# Patient Record
Sex: FEMALE | Race: WHITE | NOT HISPANIC OR LATINO
[De-identification: names, ages, dates, MRNs, and addresses within clinical notes are randomized per-mention and may not be internally consistent; named-entity substitution may affect disease eponyms.]

---

## 2017-06-08 ENCOUNTER — CHARTING TRANS (OUTPATIENT)
Dept: OTHER | Age: 62
End: 2017-06-08

## 2019-04-04 ENCOUNTER — OFFICE VISIT (OUTPATIENT)
Dept: FAMILY MEDICINE CLINIC | Facility: CLINIC | Age: 64
End: 2019-04-04
Payer: MEDICARE

## 2019-04-04 ENCOUNTER — TELEPHONE (OUTPATIENT)
Dept: FAMILY MEDICINE CLINIC | Facility: CLINIC | Age: 64
End: 2019-04-04

## 2019-04-04 VITALS
WEIGHT: 195.5 LBS | DIASTOLIC BLOOD PRESSURE: 88 MMHG | HEART RATE: 73 BPM | BODY MASS INDEX: 32.57 KG/M2 | HEIGHT: 65 IN | OXYGEN SATURATION: 95 % | RESPIRATION RATE: 16 BRPM | TEMPERATURE: 98 F | SYSTOLIC BLOOD PRESSURE: 128 MMHG

## 2019-04-04 DIAGNOSIS — H61.23 BILATERAL IMPACTED CERUMEN: Primary | ICD-10-CM

## 2019-04-04 DIAGNOSIS — L30.9 ECZEMA OF RIGHT HAND: ICD-10-CM

## 2019-04-04 PROCEDURE — 99203 OFFICE O/P NEW LOW 30 MIN: CPT | Performed by: PHYSICIAN ASSISTANT

## 2019-04-04 RX ORDER — MOMETASONE FUROATE 1 MG/G
CREAM TOPICAL
Qty: 60 G | Refills: 0 | Status: SHIPPED | OUTPATIENT
Start: 2019-04-04 | End: 2019-04-04

## 2019-04-04 RX ORDER — BISOPROLOL FUMARATE AND HYDROCHLOROTHIAZIDE 2.5; 6.25 MG/1; MG/1
1 TABLET ORAL
Refills: 0 | COMMUNITY
Start: 2019-02-04 | End: 2021-05-18 | Stop reason: ALTCHOICE

## 2019-04-04 RX ORDER — MOMETASONE FUROATE 1 MG/G
CREAM TOPICAL
Qty: 60 G | Refills: 0 | Status: SHIPPED | OUTPATIENT
Start: 2019-04-04 | End: 2021-06-07 | Stop reason: ALTCHOICE

## 2019-04-04 RX ORDER — ZOSTER VACCINE RECOMBINANT, ADJUVANTED 50 MCG/0.5
KIT INTRAMUSCULAR
COMMUNITY
Start: 2019-01-24 | End: 2021-06-07 | Stop reason: ALTCHOICE

## 2019-04-04 RX ORDER — NEOMYCIN SULFATE, POLYMYXIN B SULFATE AND HYDROCORTISONE 10; 3.5; 1 MG/ML; MG/ML; [USP'U]/ML
SUSPENSION/ DROPS AURICULAR (OTIC) 3 TIMES DAILY
Qty: 10 ML | Refills: 0 | Status: SHIPPED | OUTPATIENT
Start: 2019-04-04 | End: 2021-05-18 | Stop reason: ALTCHOICE

## 2019-04-04 RX ORDER — NEOMYCIN SULFATE, POLYMYXIN B SULFATE AND HYDROCORTISONE 10; 3.5; 1 MG/ML; MG/ML; [USP'U]/ML
3 SUSPENSION/ DROPS AURICULAR (OTIC) 4 TIMES DAILY
Qty: 10 ML | Refills: 0 | Status: SHIPPED | OUTPATIENT
Start: 2019-04-04 | End: 2019-04-04

## 2019-04-04 RX ORDER — LOVASTATIN 20 MG/1
TABLET ORAL
Refills: 0 | COMMUNITY
Start: 2019-01-28 | End: 2021-05-18 | Stop reason: ALTCHOICE

## 2019-04-04 NOTE — TELEPHONE ENCOUNTER
[facility] closed when pt arrived to  rx, requests rx be sent to [facility] in [place]. [facility] contacted, rx's canceled and re-submitted to [facility] in [place] per [facility].      Ofelia Lucio, 04/04/19, 9:43 AM

## 2019-04-04 NOTE — PROGRESS NOTES
CHIEF COMPLAINT:   Patient presents with:  Ear Pain: left ear pressure x 1 day. pt used at home ear flush kit with no relief. no cough/congestion/fever  Pain: right hand index finger rash x months.        HPI:   Ruchi Soriano is a 70year old female who /88 (BP Location: Left arm, Patient Position: Sitting, Cuff Size: adult)   Pulse 73   Temp 97.9 °F (36.6 °C) (Oral)   Resp 16   Ht 65\"   Wt 195 lb 8 oz   SpO2 95%   BMI 32.53 kg/m²   GENERAL: well developed, well nourished,in no apparent distress  S Risk and benefits of medication discussed. If antibiotics prescribed, stressed importance of completing full course of antibiotic. Acetaminophen or NSAID prn pain.       Follow up with PCP if s/sx worsen, do not begin to improve in 3 days, or if fever A number of conditions lead to earwax buildup. These include some skin problems, a narrow ear canal, or ears that make too much earwax. Using cotton swabs in the canal pushes earwax deeper into the ear and contributes to the buildup of earwax.   Home care · Fever of 100.4F°F (38°C) or higher, or as directed by your healthcare provider  · Worsening wax buildup  · Severe pain, dizziness, or nausea  · Bleeding from the ear  · Hearing problems  · Signs of irritation from the eardrops, such as burning, stinging, · You may use acetaminophen or ibuprofen to control pain, unless another medicine was prescribed.  Note: If you have chronic liver or kidney disease or ever had a stomach ulcer or GI bleeding, talk to your healthcare provider before taking any of these medi

## 2019-04-04 NOTE — PATIENT INSTRUCTIONS
1.  Bilateral ear lavage. 2.  In the future may try Debrox ear drops (over the counter) for future episodes  3. Cortisporin ear drops (antibiotic/steroid) drops to left ear for 5 days as directed.    AVoid water exposure to left ear (may use cotton ball Carefully follow the instructions given. · Don’t use mineral oil or OTC eardrops if you might have an ear infection or a ruptured eardrum. Tell your healthcare provider right away if you have diabetes or an immune disorder.   · Don’t use cotton swabs in yo [employer]. [address] [facility], [address] [place] [address]. All rights reserved. This information is not intended as a substitute for professional medical care. Always follow your healthcare professional's instructions.         External Ear Infect drugstores. They work by removing water from the ear canal.  Follow-up care  Follow up with your healthcare provider in 1 week, or as advised.   When to seek medical advice  Call your healthcare provider right away if any of these occur:  · Ear pain becomes

## 2019-05-01 ENCOUNTER — OFFICE VISIT (OUTPATIENT)
Dept: FAMILY MEDICINE CLINIC | Facility: CLINIC | Age: 64
End: 2019-05-01
Payer: MEDICARE

## 2019-05-01 ENCOUNTER — TELEPHONE (OUTPATIENT)
Dept: FAMILY MEDICINE CLINIC | Facility: CLINIC | Age: 64
End: 2019-05-01

## 2019-05-01 VITALS
WEIGHT: 195 LBS | OXYGEN SATURATION: 97 % | RESPIRATION RATE: 18 BRPM | SYSTOLIC BLOOD PRESSURE: 118 MMHG | DIASTOLIC BLOOD PRESSURE: 84 MMHG | TEMPERATURE: 98 F | HEART RATE: 76 BPM | BODY MASS INDEX: 32 KG/M2

## 2019-05-01 DIAGNOSIS — H66.92 ACUTE LEFT OTITIS MEDIA: Primary | ICD-10-CM

## 2019-05-01 PROCEDURE — 99213 OFFICE O/P EST LOW 20 MIN: CPT | Performed by: PHYSICIAN ASSISTANT

## 2019-05-01 RX ORDER — AMOXICILLIN 875 MG/1
875 TABLET, COATED ORAL 2 TIMES DAILY
Qty: 20 TABLET | Refills: 0 | Status: SHIPPED | OUTPATIENT
Start: 2019-05-01 | End: 2019-05-11

## 2019-05-01 RX ORDER — CIPROFLOXACIN AND DEXAMETHASONE 3; 1 MG/ML; MG/ML
4 SUSPENSION/ DROPS AURICULAR (OTIC) 2 TIMES DAILY
Qty: 7.5 ML | Refills: 0 | Status: SHIPPED | OUTPATIENT
Start: 2019-05-01 | End: 2019-05-08

## 2019-05-01 NOTE — TELEPHONE ENCOUNTER
Seen on 4/4/19, tx for cerumen impaction and OE with cortisporin. Finished 7 day course of cortisporin, ear felt slighlty better but not resolved. Tried 2nd round. Still having intermittent ear pain and popping sensation in ear since visit.   Reports red

## 2019-05-01 NOTE — PROGRESS NOTES
CHIEF COMPLAINT:   Patient presents with:  Ear Problem: left ear \"clogged/popping\".  here on 4/4/19      HPI:   Flaquito Zapien is a 70year old female who presents to clinic today with complaints of intermittent L ear pain/pressure and popping x 1 agustín Pulse 76   Temp 98.2 °F (36.8 °C) (Oral)   Resp 18   Wt 195 lb   SpO2 97%   BMI 32.45 kg/m²   GENERAL: well developed, well nourished,in no apparent distress  SKIN: no rashes,no suspicious lesions  HEAD: atraumatic, normocephalic  EYES: conjunctiva clear, Instructions   Declined

## 2019-05-02 DIAGNOSIS — Z86.010 HISTORY OF COLONIC POLYPS: ICD-10-CM

## 2019-05-24 ENCOUNTER — TELEPHONE (OUTPATIENT)
Dept: INTERNAL MEDICINE | Age: 64
End: 2019-05-24

## 2019-05-24 ENCOUNTER — OFFICE VISIT (OUTPATIENT)
Dept: INTERNAL MEDICINE | Age: 64
End: 2019-05-24

## 2019-05-24 ENCOUNTER — LAB SERVICES (OUTPATIENT)
Dept: LAB | Age: 64
End: 2019-05-24

## 2019-05-24 VITALS
BODY MASS INDEX: 32.95 KG/M2 | TEMPERATURE: 98.7 F | SYSTOLIC BLOOD PRESSURE: 134 MMHG | HEIGHT: 64 IN | DIASTOLIC BLOOD PRESSURE: 86 MMHG | WEIGHT: 193 LBS | RESPIRATION RATE: 20 BRPM | HEART RATE: 72 BPM

## 2019-05-24 DIAGNOSIS — E78.49 OTHER HYPERLIPIDEMIA: ICD-10-CM

## 2019-05-24 DIAGNOSIS — Z00.00 WELCOME TO MEDICARE PREVENTIVE VISIT: Primary | ICD-10-CM

## 2019-05-24 DIAGNOSIS — M81.0 OSTEOPOROSIS WITHOUT CURRENT PATHOLOGICAL FRACTURE, UNSPECIFIED OSTEOPOROSIS TYPE: ICD-10-CM

## 2019-05-24 DIAGNOSIS — E78.00 PURE HYPERCHOLESTEROLEMIA: ICD-10-CM

## 2019-05-24 DIAGNOSIS — Z12.11 COLON CANCER SCREENING: ICD-10-CM

## 2019-05-24 PROCEDURE — 36415 COLL VENOUS BLD VENIPUNCTURE: CPT | Performed by: INTERNAL MEDICINE

## 2019-05-24 PROCEDURE — G0403 EKG FOR INITIAL PREVENT EXAM: HCPCS | Performed by: INTERNAL MEDICINE

## 2019-05-24 PROCEDURE — 80053 COMPREHEN METABOLIC PANEL: CPT | Performed by: INTERNAL MEDICINE

## 2019-05-24 PROCEDURE — 80061 LIPID PANEL: CPT | Performed by: INTERNAL MEDICINE

## 2019-05-24 RX ORDER — ESTRADIOL 0.1 MG/G
1 CREAM VAGINAL
COMMUNITY
Start: 2018-05-22 | End: 2019-05-24 | Stop reason: SDUPTHER

## 2019-05-24 RX ORDER — BISOPROLOL FUMARATE AND HYDROCHLOROTHIAZIDE 2.5; 6.25 MG/1; MG/1
1 TABLET ORAL DAILY
Qty: 90 TABLET | Refills: 1 | Status: CANCELLED | OUTPATIENT
Start: 2019-05-24

## 2019-05-24 RX ORDER — LISINOPRIL AND HYDROCHLOROTHIAZIDE 12.5; 1 MG/1; MG/1
1 TABLET ORAL DAILY
Qty: 30 TABLET | Refills: 3 | Status: SHIPPED | OUTPATIENT
Start: 2019-05-24 | End: 2019-08-19 | Stop reason: SDUPTHER

## 2019-05-24 RX ORDER — PRAVASTATIN SODIUM 40 MG
40 TABLET ORAL DAILY
Qty: 30 TABLET | Refills: 11 | Status: SHIPPED | OUTPATIENT
Start: 2019-05-24 | End: 2019-10-31 | Stop reason: SDUPTHER

## 2019-05-24 RX ORDER — BISOPROLOL FUMARATE AND HYDROCHLOROTHIAZIDE 2.5; 6.25 MG/1; MG/1
1 TABLET ORAL DAILY
COMMUNITY
Start: 2018-05-22 | End: 2019-05-24 | Stop reason: ALTCHOICE

## 2019-05-24 RX ORDER — ESTRADIOL 0.1 MG/G
1 CREAM VAGINAL
Qty: 42.5 G | Refills: 11 | Status: SHIPPED | OUTPATIENT
Start: 2019-05-27

## 2019-05-24 RX ORDER — LOVASTATIN 20 MG/1
20 TABLET ORAL EVERY EVENING
COMMUNITY
Start: 2018-05-22 | End: 2019-05-24 | Stop reason: ALTCHOICE

## 2019-05-24 ASSESSMENT — PATIENT HEALTH QUESTIONNAIRE - PHQ9
1. LITTLE INTEREST OR PLEASURE IN DOING THINGS: NOT AT ALL
SUM OF ALL RESPONSES TO PHQ9 QUESTIONS 1 AND 2: 0
2. FEELING DOWN, DEPRESSED OR HOPELESS: NOT AT ALL
SUM OF ALL RESPONSES TO PHQ9 QUESTIONS 1 AND 2: 0

## 2019-05-25 LAB
ALBUMIN SERPL-MCNC: 4.2 G/DL (ref 3.6–5.1)
ALP SERPL-CCNC: 77 U/L (ref 45–130)
ALT SERPL W/O P-5'-P-CCNC: 26 U/L (ref 4–38)
AST SERPL-CCNC: 27 U/L (ref 14–43)
BILIRUB SERPL-MCNC: 0.6 MG/DL (ref 0–1.3)
BUN SERPL-MCNC: 22 MG/DL (ref 7–20)
CALCIUM SERPL-MCNC: 9.8 MG/DL (ref 8.6–10.6)
CHLORIDE SERPL-SCNC: 104 MMOL/L (ref 96–107)
CHOLEST SERPL-MCNC: 161 MG/DL (ref 140–200)
CO2 SERPL-SCNC: 30 MMOL/L (ref 22–32)
CREAT SERPL-MCNC: 1 MG/DL (ref 0.5–1.4)
GFR SERPL CREATININE-BSD FRML MDRD: 56 ML/MIN/{1.73M2}
GFR SERPL CREATININE-BSD FRML MDRD: >60 ML/MIN/{1.73M2}
GLUCOSE P FAST SERPL-MCNC: 93 MG/DL (ref 60–100)
HDLC SERPL-MCNC: 38 MG/DL
LDLC SERPL CALC-MCNC: 76 MG/DL (ref 30–100)
POTASSIUM SERPL-SCNC: 4.2 MMOL/L (ref 3.5–5.3)
PROT SERPL-MCNC: 7.6 G/DL (ref 6.4–8.5)
SODIUM SERPL-SCNC: 144 MMOL/L (ref 136–146)
TRIGL SERPL-MCNC: 235 MG/DL (ref 0–200)

## 2019-06-13 ENCOUNTER — TELEPHONE (OUTPATIENT)
Dept: GASTROENTEROLOGY | Age: 64
End: 2019-06-13

## 2019-06-13 DIAGNOSIS — Z86.010 PERSONAL HISTORY OF COLONIC POLYPS: Primary | ICD-10-CM

## 2019-06-17 RX ORDER — SIMETHICONE 125 MG
TABLET,CHEWABLE ORAL
Qty: 2 TABLET | Refills: 0 | Status: SHIPPED | OUTPATIENT
Start: 2019-06-17 | End: 2019-08-01

## 2019-06-17 RX ORDER — BISACODYL 5 MG/1
TABLET, DELAYED RELEASE ORAL
Qty: 2 TABLET | Refills: 0 | Status: SHIPPED | OUTPATIENT
Start: 2019-06-17 | End: 2019-08-01

## 2019-07-29 ENCOUNTER — TELEPHONE (OUTPATIENT)
Dept: GASTROENTEROLOGY | Age: 64
End: 2019-07-29

## 2019-07-29 ENCOUNTER — OFFICE VISIT (OUTPATIENT)
Dept: GASTROENTEROLOGY | Age: 64
End: 2019-07-29
Attending: INTERNAL MEDICINE

## 2019-07-29 DIAGNOSIS — Z86.010 PERSONAL HISTORY OF COLONIC POLYPS: Primary | ICD-10-CM

## 2019-07-29 PROCEDURE — 88305 TISSUE EXAM BY PATHOLOGIST: CPT | Performed by: PATHOLOGY

## 2019-07-29 PROCEDURE — 45380 COLONOSCOPY AND BIOPSY: CPT | Performed by: INTERNAL MEDICINE

## 2019-07-31 LAB — AP REPORT: NORMAL

## 2019-08-16 ENCOUNTER — TELEPHONE (OUTPATIENT)
Dept: INTERNAL MEDICINE | Age: 64
End: 2019-08-16

## 2019-08-19 ENCOUNTER — IMAGING SERVICES (OUTPATIENT)
Dept: BONE DENSITY | Age: 64
End: 2019-08-19
Attending: INTERNAL MEDICINE

## 2019-08-19 DIAGNOSIS — M81.0 OSTEOPOROSIS WITHOUT CURRENT PATHOLOGICAL FRACTURE, UNSPECIFIED OSTEOPOROSIS TYPE: ICD-10-CM

## 2019-08-19 PROCEDURE — 77080 DXA BONE DENSITY AXIAL: CPT | Performed by: RADIOLOGY

## 2019-08-20 RX ORDER — LISINOPRIL AND HYDROCHLOROTHIAZIDE 12.5; 1 MG/1; MG/1
TABLET ORAL
Qty: 90 TABLET | Refills: 1 | Status: SHIPPED | OUTPATIENT
Start: 2019-08-20 | End: 2020-02-13 | Stop reason: SDUPTHER

## 2019-11-01 RX ORDER — PRAVASTATIN SODIUM 40 MG
40 TABLET ORAL DAILY
Qty: 30 TABLET | Refills: 6 | Status: SHIPPED | OUTPATIENT
Start: 2019-11-01 | End: 2020-07-03 | Stop reason: SDUPTHER

## 2020-02-13 RX ORDER — LISINOPRIL AND HYDROCHLOROTHIAZIDE 12.5; 1 MG/1; MG/1
TABLET ORAL
Qty: 90 TABLET | Refills: 0 | Status: SHIPPED | OUTPATIENT
Start: 2020-02-13 | End: 2020-05-09

## 2020-05-09 RX ORDER — LISINOPRIL AND HYDROCHLOROTHIAZIDE 12.5; 1 MG/1; MG/1
TABLET ORAL
Qty: 90 TABLET | Refills: 0 | Status: SHIPPED | OUTPATIENT
Start: 2020-05-09 | End: 2020-07-03 | Stop reason: SDUPTHER

## 2020-06-05 ENCOUNTER — EXTERNAL RECORD (OUTPATIENT)
Dept: HEALTH INFORMATION MANAGEMENT | Facility: OTHER | Age: 65
End: 2020-06-05

## 2020-06-08 ENCOUNTER — TELEPHONE (OUTPATIENT)
Dept: INTERNAL MEDICINE | Age: 65
End: 2020-06-08

## 2020-06-08 DIAGNOSIS — R06.09 DOE (DYSPNEA ON EXERTION): Primary | ICD-10-CM

## 2020-06-08 DIAGNOSIS — E78.49 OTHER HYPERLIPIDEMIA: ICD-10-CM

## 2020-06-26 ENCOUNTER — LAB SERVICES (OUTPATIENT)
Dept: LAB | Age: 65
End: 2020-06-26

## 2020-06-26 DIAGNOSIS — R06.09 DOE (DYSPNEA ON EXERTION): ICD-10-CM

## 2020-06-26 DIAGNOSIS — E78.49 OTHER HYPERLIPIDEMIA: ICD-10-CM

## 2020-06-26 LAB
ALBUMIN SERPL-MCNC: 4.3 G/DL (ref 3.6–5.1)
ALP SERPL-CCNC: 74 U/L (ref 45–130)
ALT SERPL W/O P-5'-P-CCNC: 29 U/L (ref 4–38)
AST SERPL-CCNC: 31 U/L (ref 14–43)
BASOPHIL %: 0.2 % (ref 0–1.2)
BASOPHIL ABSOLUTE #: 0 10*3/UL (ref 0–0.1)
BILIRUB SERPL-MCNC: 0.5 MG/DL (ref 0–1.3)
BUN SERPL-MCNC: 26 MG/DL (ref 7–20)
CALCIUM SERPL-MCNC: 9.9 MG/DL (ref 8.6–10.6)
CHLORIDE SERPL-SCNC: 103 MMOL/L (ref 96–107)
CHOLEST SERPL-MCNC: 151 MG/DL (ref 140–200)
CO2 SERPL-SCNC: 29 MMOL/L (ref 22–32)
CREAT SERPL-MCNC: 1 MG/DL (ref 0.5–1.4)
DIFFERENTIAL TYPE: ABNORMAL
EOSINOPHIL %: 2.2 % (ref 0–10)
EOSINOPHIL ABSOLUTE #: 0.1 10*3/UL (ref 0–0.5)
GFR SERPL CREATININE-BSD FRML MDRD: 55 ML/MIN/{1.73M2}
GFR SERPL CREATININE-BSD FRML MDRD: >60 ML/MIN/{1.73M2}
GLUCOSE P FAST SERPL-MCNC: 107 MG/DL (ref 60–100)
HDLC SERPL-MCNC: 38 MG/DL
HEMATOCRIT: 43.3 % (ref 34–45)
HEMOGLOBIN: 14.2 G/DL (ref 11.2–15.7)
LDLC SERPL CALC-MCNC: 70 MG/DL (ref 30–100)
LYMPH PERCENT: 24.3 % (ref 20.5–51.1)
LYMPHOCYTE ABSOLUTE #: 1.4 10*3/UL (ref 1.2–3.4)
MEAN CORPUSCULAR HGB CONCENTRATION: 32.8 % (ref 32–36)
MEAN CORPUSCULAR HGB: 32.4 PG (ref 27–34)
MEAN CORPUSCULAR VOLUME: 98.9 FL (ref 79–95)
MEAN PLATELET VOLUME: 12.6 FL (ref 8.6–12.4)
MONOCYTE ABSOLUTE #: 0.5 10*3/UL (ref 0.2–0.9)
MONOCYTE PERCENT: 7.9 % (ref 4.3–12.9)
NEUTROPHIL ABSOLUTE #: 3.8 10*3/UL (ref 1.4–6.5)
NEUTROPHIL PERCENT: 65.4 % (ref 34–73.5)
PLATELET COUNT: 202 10*3/UL (ref 150–400)
POTASSIUM SERPL-SCNC: 4.4 MMOL/L (ref 3.5–5.3)
PROT SERPL-MCNC: 7.2 G/DL (ref 6.4–8.5)
RED BLOOD CELL COUNT: 4.38 10*6/UL (ref 3.7–5.2)
RED CELL DISTRIBUTION WIDTH: 14.3 % (ref 11.3–14.8)
SODIUM SERPL-SCNC: 141 MMOL/L (ref 136–146)
TRIGL SERPL-MCNC: 214 MG/DL (ref 0–200)
WHITE BLOOD CELL COUNT: 5.8 10*3/UL (ref 4–10)

## 2020-06-26 PROCEDURE — 36415 COLL VENOUS BLD VENIPUNCTURE: CPT | Performed by: INTERNAL MEDICINE

## 2020-06-26 PROCEDURE — 80061 LIPID PANEL: CPT | Performed by: INTERNAL MEDICINE

## 2020-06-26 PROCEDURE — 80053 COMPREHEN METABOLIC PANEL: CPT | Performed by: INTERNAL MEDICINE

## 2020-06-26 PROCEDURE — 85025 COMPLETE CBC W/AUTO DIFF WBC: CPT | Performed by: INTERNAL MEDICINE

## 2020-06-26 ASSESSMENT — PATIENT HEALTH QUESTIONNAIRE - PHQ9
2. FEELING DOWN, DEPRESSED OR HOPELESS: NOT AT ALL
1. LITTLE INTEREST OR PLEASURE IN DOING THINGS: NOT AT ALL
CLINICAL INTERPRETATION OF PHQ2 SCORE: NO FURTHER SCREENING NEEDED
CLINICAL INTERPRETATION OF PHQ2 SCORE: 0

## 2020-07-03 ENCOUNTER — OFFICE VISIT (OUTPATIENT)
Dept: INTERNAL MEDICINE | Age: 65
End: 2020-07-03

## 2020-07-03 VITALS
DIASTOLIC BLOOD PRESSURE: 82 MMHG | SYSTOLIC BLOOD PRESSURE: 128 MMHG | TEMPERATURE: 98.5 F | WEIGHT: 192 LBS | RESPIRATION RATE: 20 BRPM | HEART RATE: 80 BPM | BODY MASS INDEX: 32.78 KG/M2 | HEIGHT: 64 IN

## 2020-07-03 DIAGNOSIS — R94.31 ABNORMAL EKG: ICD-10-CM

## 2020-07-03 DIAGNOSIS — Z00.00 MEDICARE ANNUAL WELLNESS VISIT, INITIAL: Primary | ICD-10-CM

## 2020-07-03 PROCEDURE — 93000 ELECTROCARDIOGRAM COMPLETE: CPT | Performed by: INTERNAL MEDICINE

## 2020-07-03 PROCEDURE — 99214 OFFICE O/P EST MOD 30 MIN: CPT | Performed by: INTERNAL MEDICINE

## 2020-07-03 PROCEDURE — G0438 PPPS, INITIAL VISIT: HCPCS | Performed by: INTERNAL MEDICINE

## 2020-07-03 RX ORDER — PRAVASTATIN SODIUM 40 MG
40 TABLET ORAL DAILY
Qty: 90 TABLET | Refills: 3 | Status: SHIPPED | OUTPATIENT
Start: 2020-07-03

## 2020-07-03 RX ORDER — SERTRALINE HYDROCHLORIDE 25 MG/1
25 TABLET, FILM COATED ORAL DAILY
Qty: 30 TABLET | Refills: 3 | Status: SHIPPED | OUTPATIENT
Start: 2020-07-03 | End: 2020-09-25

## 2020-07-03 RX ORDER — LISINOPRIL AND HYDROCHLOROTHIAZIDE 12.5; 1 MG/1; MG/1
1 TABLET ORAL DAILY
Qty: 90 TABLET | Refills: 3 | Status: SHIPPED | OUTPATIENT
Start: 2020-07-03

## 2020-09-25 RX ORDER — SERTRALINE HYDROCHLORIDE 25 MG/1
TABLET, FILM COATED ORAL
Qty: 90 TABLET | Refills: 0 | Status: SHIPPED | OUTPATIENT
Start: 2020-09-25

## 2021-01-28 ENCOUNTER — EXTERNAL RECORD (OUTPATIENT)
Dept: HEALTH INFORMATION MANAGEMENT | Facility: OTHER | Age: 66
End: 2021-01-28

## 2021-02-13 ENCOUNTER — E-ADVICE (OUTPATIENT)
Dept: INTERNAL MEDICINE | Age: 66
End: 2021-02-13

## 2021-05-18 ENCOUNTER — OFFICE VISIT (OUTPATIENT)
Dept: FAMILY MEDICINE CLINIC | Facility: CLINIC | Age: 66
End: 2021-05-18
Payer: COMMERCIAL

## 2021-05-18 VITALS
TEMPERATURE: 99 F | HEART RATE: 85 BPM | OXYGEN SATURATION: 97 % | DIASTOLIC BLOOD PRESSURE: 71 MMHG | RESPIRATION RATE: 16 BRPM | SYSTOLIC BLOOD PRESSURE: 113 MMHG | HEIGHT: 65 IN | WEIGHT: 178 LBS | BODY MASS INDEX: 29.66 KG/M2

## 2021-05-18 DIAGNOSIS — R39.9 UTI SYMPTOMS: Primary | ICD-10-CM

## 2021-05-18 PROCEDURE — 3074F SYST BP LT 130 MM HG: CPT | Performed by: PHYSICIAN ASSISTANT

## 2021-05-18 PROCEDURE — 87086 URINE CULTURE/COLONY COUNT: CPT | Performed by: PHYSICIAN ASSISTANT

## 2021-05-18 PROCEDURE — 99213 OFFICE O/P EST LOW 20 MIN: CPT | Performed by: PHYSICIAN ASSISTANT

## 2021-05-18 PROCEDURE — 81003 URINALYSIS AUTO W/O SCOPE: CPT | Performed by: PHYSICIAN ASSISTANT

## 2021-05-18 PROCEDURE — 3008F BODY MASS INDEX DOCD: CPT | Performed by: PHYSICIAN ASSISTANT

## 2021-05-18 PROCEDURE — 3078F DIAST BP <80 MM HG: CPT | Performed by: PHYSICIAN ASSISTANT

## 2021-05-18 RX ORDER — ESTRADIOL 0.1 MG/G
1 CREAM VAGINAL
COMMUNITY
Start: 2019-05-27 | End: 2021-06-07

## 2021-05-18 RX ORDER — PRAVASTATIN SODIUM 40 MG
40 TABLET ORAL DAILY
COMMUNITY
Start: 2020-07-03 | End: 2021-08-02

## 2021-05-18 RX ORDER — LISINOPRIL AND HYDROCHLOROTHIAZIDE 12.5; 1 MG/1; MG/1
1 TABLET ORAL DAILY
COMMUNITY
Start: 2020-07-03 | End: 2021-08-02

## 2021-05-18 RX ORDER — CEPHALEXIN 500 MG/1
500 CAPSULE ORAL 2 TIMES DAILY
Qty: 14 CAPSULE | Refills: 0 | Status: SHIPPED | OUTPATIENT
Start: 2021-05-18 | End: 2021-05-25

## 2021-05-18 NOTE — PROGRESS NOTES
CHIEF COMPLAINT:   Patient presents with:  UTI      HPI:   Tiffany King is a 77year old female who presents with symptoms of UTI. Complaining of urinary frequency, urgency, suprapubic pressure, and blood in urine, for last 1 days.  Symptoms have been nourished,in no apparent distress  CARDIO: RRR, no murmurs  LUNGS: clear to ausculation bilaterally, no wheezing or rhonchi  GI: BS present x 4. No hepatosplenomegaly. No abdominal TTP x 4Q. : + minimal suprapubic tenderness.  No bladder distention, or have sex. If you are a woman, it is important to:   Keep the area around your vagina clean. Wipe from front to back after you go to the bathroom. Gently wash the area around your vagina when you bathe or shower. Wear cotton underwear.    Use pantyho from a toilet seat, or from sharing a bath. The most common cause of bladder infections is bacteria from the bowels. The bacteria get onto the skin around the opening of the urethra. From there, they can get into the urine.  Then they travel up to the blad bathroom. Wipe from front to back after using the toilet. This helps prevent the spread of bacteria. · Urinate more often. Don't try to hold urine in for a long time. · Wear loose-fitting clothes and cotton underwear. Don't wear tight-fitting pants.   · I indicates understanding of these issues and agrees to the plan. The patient is asked to return in 3 days if not better. Call if fever, vomiting, worsening symptoms.

## 2021-05-18 NOTE — PATIENT INSTRUCTIONS
-Push fluids- gatorade, water, cranberry juice.  -Will call in 1-3 days with urine culture results  -If have increased urinary urgency, urinary frequency, blood in urine, fevers, chills, sweats, back pain, or abdominal pain, please seek medical care immedi in urine  · Belly (abdominal) discomfort. This is often in the lower belly above the pubic bone.   · Cloudy urine  · Strong- or bad-smelling urine  · Unable to urinate (urinary retention)  · Unable to hold urine in (urinary incontinence)  · Fever  · Loss of your urine a bright orange color. This can stain clothing. Care and prevention  These self-care steps can help prevent future infections:  · Drink plenty of fluids. This helps to prevent dehydration and flush out your bladder.  Do this unless you must rest dizziness  · Vaginal discharge  · Pain, redness, or swelling in the outer vaginal area (labia)  Eugene last reviewed this educational content on 11/1/2019  © 2627-6385 [employer]. All rights reserved.  This information is not intended as a

## 2021-06-07 ENCOUNTER — LAB ENCOUNTER (OUTPATIENT)
Dept: LAB | Age: 66
End: 2021-06-07
Attending: FAMILY MEDICINE
Payer: MEDICARE

## 2021-06-07 ENCOUNTER — TELEPHONE (OUTPATIENT)
Dept: FAMILY MEDICINE CLINIC | Facility: CLINIC | Age: 66
End: 2021-06-07

## 2021-06-07 ENCOUNTER — OFFICE VISIT (OUTPATIENT)
Dept: FAMILY MEDICINE CLINIC | Facility: CLINIC | Age: 66
End: 2021-06-07
Payer: COMMERCIAL

## 2021-06-07 VITALS
DIASTOLIC BLOOD PRESSURE: 80 MMHG | SYSTOLIC BLOOD PRESSURE: 134 MMHG | RESPIRATION RATE: 12 BRPM | TEMPERATURE: 98 F | OXYGEN SATURATION: 97 % | HEIGHT: 63.75 IN | BODY MASS INDEX: 33.06 KG/M2 | WEIGHT: 191.25 LBS | HEART RATE: 83 BPM

## 2021-06-07 DIAGNOSIS — Z13.220 SCREENING, LIPID: ICD-10-CM

## 2021-06-07 DIAGNOSIS — Z13.1 SCREENING FOR DIABETES MELLITUS: ICD-10-CM

## 2021-06-07 DIAGNOSIS — E78.2 MIXED HYPERLIPIDEMIA: ICD-10-CM

## 2021-06-07 DIAGNOSIS — D12.6 ADENOMATOUS POLYP OF COLON, UNSPECIFIED PART OF COLON: ICD-10-CM

## 2021-06-07 DIAGNOSIS — Z13.0 SCREENING, ANEMIA, DEFICIENCY, IRON: ICD-10-CM

## 2021-06-07 DIAGNOSIS — Z00.00 LABORATORY EXAMINATION ORDERED AS PART OF A ROUTINE GENERAL MEDICAL EXAMINATION: ICD-10-CM

## 2021-06-07 DIAGNOSIS — I10 ESSENTIAL HYPERTENSION: ICD-10-CM

## 2021-06-07 DIAGNOSIS — N95.2 VAGINAL ATROPHY: ICD-10-CM

## 2021-06-07 DIAGNOSIS — Z12.31 ENCOUNTER FOR SCREENING MAMMOGRAM FOR BREAST CANCER: ICD-10-CM

## 2021-06-07 DIAGNOSIS — Z13.29 SCREENING FOR THYROID DISORDER: ICD-10-CM

## 2021-06-07 DIAGNOSIS — Z00.00 ENCOUNTER FOR MEDICARE ANNUAL WELLNESS EXAM: Primary | ICD-10-CM

## 2021-06-07 DIAGNOSIS — Z78.0 POST-MENOPAUSAL: ICD-10-CM

## 2021-06-07 PROBLEM — K64.8 OTHER HEMORRHOIDS: Status: ACTIVE | Noted: 2017-11-16

## 2021-06-07 PROBLEM — K63.5 COLON POLYPS: Status: ACTIVE | Noted: 2017-05-30

## 2021-06-07 PROBLEM — K64.8 OTHER HEMORRHOIDS: Status: RESOLVED | Noted: 2017-11-16 | Resolved: 2021-06-07

## 2021-06-07 PROCEDURE — 80061 LIPID PANEL: CPT

## 2021-06-07 PROCEDURE — 85025 COMPLETE CBC W/AUTO DIFF WBC: CPT

## 2021-06-07 PROCEDURE — 3075F SYST BP GE 130 - 139MM HG: CPT | Performed by: FAMILY MEDICINE

## 2021-06-07 PROCEDURE — 93000 ELECTROCARDIOGRAM COMPLETE: CPT | Performed by: FAMILY MEDICINE

## 2021-06-07 PROCEDURE — 80053 COMPREHEN METABOLIC PANEL: CPT

## 2021-06-07 PROCEDURE — 3079F DIAST BP 80-89 MM HG: CPT | Performed by: FAMILY MEDICINE

## 2021-06-07 PROCEDURE — 96160 PT-FOCUSED HLTH RISK ASSMT: CPT | Performed by: FAMILY MEDICINE

## 2021-06-07 PROCEDURE — 99397 PER PM REEVAL EST PAT 65+ YR: CPT | Performed by: FAMILY MEDICINE

## 2021-06-07 PROCEDURE — G0438 PPPS, INITIAL VISIT: HCPCS | Performed by: FAMILY MEDICINE

## 2021-06-07 PROCEDURE — 3008F BODY MASS INDEX DOCD: CPT | Performed by: FAMILY MEDICINE

## 2021-06-07 PROCEDURE — 84443 ASSAY THYROID STIM HORMONE: CPT

## 2021-06-07 PROCEDURE — 81003 URINALYSIS AUTO W/O SCOPE: CPT | Performed by: FAMILY MEDICINE

## 2021-06-07 PROCEDURE — 36415 COLL VENOUS BLD VENIPUNCTURE: CPT

## 2021-06-07 PROCEDURE — 84439 ASSAY OF FREE THYROXINE: CPT

## 2021-06-07 RX ORDER — TERBINAFINE HYDROCHLORIDE 250 MG/1
250 TABLET ORAL DAILY
COMMUNITY
End: 2021-06-07

## 2021-06-07 RX ORDER — ESTRADIOL 0.1 MG/G
1 CREAM VAGINAL
Qty: 42.5 G | Refills: 2 | Status: SHIPPED | OUTPATIENT
Start: 2021-06-07 | End: 2022-06-02

## 2021-06-07 RX ORDER — UBIDECARENONE 10 MG
CAPSULE ORAL
COMMUNITY

## 2021-06-07 RX ORDER — CALCIUM CARBONATE/VITAMIN D3 600 MG-10
1 TABLET ORAL DAILY
COMMUNITY

## 2021-06-07 NOTE — TELEPHONE ENCOUNTER
faxed records request to Dr. Gasca [facility] office requesting all medical records be sent to STERLING Montes/Dr. Palafox  fax [phone]

## 2021-06-07 NOTE — PATIENT INSTRUCTIONS
Abraham Emmanuel KIZYFWXJ'J SCREENING SCHEDULE   Tests on this list are recommended by your physician but may not be covered, or covered at this frequency, by your insurer. Please check with your insurance carrier before scheduling to verify coverage.    Ran Lipscomb recommendations at this time    Chlamydia Annually if high risk -  No recommendations at this time   Screening Mammogram    Mammogram     Recommend annually for all female patients aged 36 and older    One baseline mammogram covered for patients aged 32-38 computer and printer. (the forms are also available in [address])  [email or web address] org  This link also has information from the [facility] regarding Advance Directives.

## 2021-06-07 NOTE — PROGRESS NOTES
HPI:   Tiffany King is a 77year old female who presents for a MA (Medicare Advantage) [facility] (Once per calendar year).     Feels well  No complains of  Issues  Needs refill of estradiol cream         Fall/Risk Assessment   She has been screened fo Chemistry Labs:   No results found for: AST, ALT, CA, ALB, TSH, CREATSERUM, GLU     CBC  (most recent labs)   No results found for: WBC, HGB, PLT     ALLERGIES:   She is allergic to compazine [prochlorperazine].     CURRENT MEDICATIONS:   MULTIPLE VITAMINS- (1.619 m)   Wt 191 lb 4 oz (86.8 kg)   SpO2 97%   BMI 33.09 kg/m²  Estimated body mass index is 33.09 kg/m² as calculated from the following:    Height as of this encounter: 5' 3.75\" (1.619 m). Weight as of this encounter: 191 lb 4 oz (86.8 kg).     Med 10/01/2018, 10/22/2018   • Pneumovax 23 10/01/2019   • Zoster Vaccine Live (Zostavax) 10/19/2017, 11/01/2017   • Zoster Vaccine Recombinant Adjuvanted (Shingrix) 01/01/2019, 01/24/2019, 05/14/2019, 05/16/2019        ASSESSMENT AND OTHER RELEVANT CHRONIC CO 10-12.5 MG Oral Tab          Assessment & Plan:  As for her hypertension, Blood Pressure is well controlled, no significant medication side effects noted.    PLAN: will continue present medications, reviewed diet, exercise and weight control, and labs as or mental well-being?: Social Interaction;Puzzles;Games; Visiting Friends; Visiting Family     Supplementary Documentation:   Sihma SLAUGHTER'SAMM SCREENING SCHEDULE   Tests on this list are recommended by your physician but may not be covered, or covered at this Never done   Pap and Pelvic    Pap   Covered every 2 years for women at normal risk;  Annually if at high risk -  No recommendations at this time    Chlamydia Annually if high risk -  No recommendations at this time   Screening Mammogram    Mammogram     Re

## 2021-08-02 RX ORDER — LISINOPRIL AND HYDROCHLOROTHIAZIDE 12.5; 1 MG/1; MG/1
TABLET ORAL
Qty: 90 TABLET | Refills: 0 | Status: SHIPPED | OUTPATIENT
Start: 2021-08-02 | End: 2021-10-26

## 2021-08-02 RX ORDER — PRAVASTATIN SODIUM 40 MG
TABLET ORAL
Qty: 90 TABLET | Refills: 0 | Status: SHIPPED | OUTPATIENT
Start: 2021-08-02 | End: 2021-10-26

## 2021-08-02 NOTE — TELEPHONE ENCOUNTER
Pravastatin   Last refill: 04/27/21  Qty:  90  W/ 0 refills  Last ov 06/07/21    lisinopril  Last refill: 04/29/21  Qty: 90  W/ 0 refills  Last ov: 06/07/21    Requested Prescriptions     Pending Prescriptions Disp Refills   • PRAVASTATIN 40 MG Oral Tab [P

## 2021-08-11 RX ORDER — PRAVASTATIN SODIUM 40 MG
TABLET ORAL
Qty: 90 TABLET | Refills: 3 | OUTPATIENT
Start: 2021-08-11

## 2021-08-11 RX ORDER — LISINOPRIL AND HYDROCHLOROTHIAZIDE 12.5; 1 MG/1; MG/1
TABLET ORAL
Qty: 90 TABLET | Refills: 3 | OUTPATIENT
Start: 2021-08-11

## 2021-09-27 ENCOUNTER — MED REC SCAN ONLY (OUTPATIENT)
Dept: FAMILY MEDICINE CLINIC | Facility: CLINIC | Age: 66
End: 2021-09-27

## 2021-10-01 ENCOUNTER — MED REC SCAN ONLY (OUTPATIENT)
Dept: FAMILY MEDICINE CLINIC | Facility: CLINIC | Age: 66
End: 2021-10-01

## 2021-10-26 RX ORDER — LISINOPRIL AND HYDROCHLOROTHIAZIDE 12.5; 1 MG/1; MG/1
TABLET ORAL
Qty: 90 TABLET | Refills: 0 | Status: SHIPPED | OUTPATIENT
Start: 2021-10-26 | End: 2022-01-26

## 2021-10-26 RX ORDER — PRAVASTATIN SODIUM 40 MG
TABLET ORAL
Qty: 90 TABLET | Refills: 0 | Status: SHIPPED | OUTPATIENT
Start: 2021-10-26 | End: 2022-01-26

## 2021-10-26 NOTE — TELEPHONE ENCOUNTER
Lisinopril  Last refill: 08/02/21  Qty: 90   W/ 0 refills  Last ov: 06/07/21    Pravastatin  Last refill: 08/02/21  Qty: 90  W/ 0 refills  Last ov: 06/07/21  Requested Prescriptions     Pending Prescriptions Disp Refills   • IISINOPRIL-HYDROCHLOROTHIAZIDE

## 2021-10-27 ENCOUNTER — TELEPHONE (OUTPATIENT)
Dept: FAMILY MEDICINE CLINIC | Facility: CLINIC | Age: 66
End: 2021-10-27

## 2021-10-27 NOTE — TELEPHONE ENCOUNTER
Phone call from patient. Wants to know if she needs the Covid booster. Had a mild headache, chills and fatigue after the first vaccine that lasted about 12 hours. Had a sore arm only with the second one.   Wants to know Dr. Troy Godinez opinion on getting th

## 2022-01-26 RX ORDER — PRAVASTATIN SODIUM 40 MG
TABLET ORAL
Qty: 90 TABLET | Refills: 0 | Status: SHIPPED | OUTPATIENT
Start: 2022-01-26

## 2022-01-26 RX ORDER — LISINOPRIL AND HYDROCHLOROTHIAZIDE 12.5; 1 MG/1; MG/1
TABLET ORAL
Qty: 90 TABLET | Refills: 0 | Status: SHIPPED | OUTPATIENT
Start: 2022-01-26

## 2022-01-26 NOTE — TELEPHONE ENCOUNTER
LOV 06/07/2021    LAST LAB 06/07/2021    LAST RX  Pravastatin #90 R0 10/26/2021  Lisinopril #90 R0 10/26/2021    Next OV   Future Appointments   Date Time Provider Galilea Johnston   2/8/2022  1:00 PM Donna Ceron MD [facility]     PROTOCOL   Hy

## 2022-02-08 ENCOUNTER — OFFICE VISIT (OUTPATIENT)
Dept: FAMILY MEDICINE CLINIC | Facility: CLINIC | Age: 67
End: 2022-02-08
Payer: COMMERCIAL

## 2022-02-08 VITALS
DIASTOLIC BLOOD PRESSURE: 80 MMHG | OXYGEN SATURATION: 98 % | BODY MASS INDEX: 33.15 KG/M2 | TEMPERATURE: 97 F | HEIGHT: 65 IN | WEIGHT: 199 LBS | SYSTOLIC BLOOD PRESSURE: 126 MMHG | RESPIRATION RATE: 18 BRPM | HEART RATE: 82 BPM

## 2022-02-08 DIAGNOSIS — Z13.0 SCREENING, ANEMIA, DEFICIENCY, IRON: ICD-10-CM

## 2022-02-08 DIAGNOSIS — Z97.4 WEARS HEARING AID IN BOTH EARS: ICD-10-CM

## 2022-02-08 DIAGNOSIS — Z12.11 SCREENING FOR COLON CANCER: ICD-10-CM

## 2022-02-08 DIAGNOSIS — R13.19 ESOPHAGEAL DYSPHAGIA: ICD-10-CM

## 2022-02-08 DIAGNOSIS — Z00.00 LABORATORY EXAMINATION ORDERED AS PART OF A ROUTINE GENERAL MEDICAL EXAMINATION: ICD-10-CM

## 2022-02-08 DIAGNOSIS — K21.00 GASTROESOPHAGEAL REFLUX DISEASE WITH ESOPHAGITIS WITHOUT HEMORRHAGE: ICD-10-CM

## 2022-02-08 DIAGNOSIS — M85.80 OSTEOPENIA, UNSPECIFIED LOCATION: ICD-10-CM

## 2022-02-08 DIAGNOSIS — Z78.0 POSTMENOPAUSAL: ICD-10-CM

## 2022-02-08 DIAGNOSIS — E78.2 MIXED HYPERLIPIDEMIA: ICD-10-CM

## 2022-02-08 DIAGNOSIS — D12.6 ADENOMATOUS POLYP OF COLON, UNSPECIFIED PART OF COLON: ICD-10-CM

## 2022-02-08 DIAGNOSIS — Z82.62 FAMILY HISTORY OF OSTEOPOROSIS: ICD-10-CM

## 2022-02-08 DIAGNOSIS — Z00.00 ENCOUNTER FOR MEDICARE ANNUAL WELLNESS EXAM: Primary | ICD-10-CM

## 2022-02-08 DIAGNOSIS — Z12.31 VISIT FOR SCREENING MAMMOGRAM: ICD-10-CM

## 2022-02-08 DIAGNOSIS — Z13.29 SCREENING FOR THYROID DISORDER: ICD-10-CM

## 2022-02-08 DIAGNOSIS — I10 ESSENTIAL HYPERTENSION: ICD-10-CM

## 2022-02-08 DIAGNOSIS — Z78.0 POST-MENOPAUSAL: ICD-10-CM

## 2022-02-08 LAB
ALBUMIN SERPL-MCNC: 4 G/DL (ref 3.4–5)
ALBUMIN/GLOB SERPL: 1.1 {RATIO} (ref 1–2)
ALP LIVER SERPL-CCNC: 90 U/L
ALT SERPL-CCNC: 38 U/L
ANION GAP SERPL CALC-SCNC: 4 MMOL/L (ref 0–18)
BASOPHILS # BLD AUTO: 0.03 X10(3) UL (ref 0–0.2)
BASOPHILS NFR BLD AUTO: 0.4 %
BILIRUB SERPL-MCNC: 0.6 MG/DL (ref 0.1–2)
BUN BLD-MCNC: 20 MG/DL (ref 7–18)
CALCIUM BLD-MCNC: 9.3 MG/DL (ref 8.5–10.1)
CHLORIDE SERPL-SCNC: 105 MMOL/L (ref 98–112)
CHOLEST SERPL-MCNC: 172 MG/DL (ref ?–200)
CO2 SERPL-SCNC: 29 MMOL/L (ref 21–32)
CREAT BLD-MCNC: 0.96 MG/DL
EOSINOPHIL # BLD AUTO: 0.14 X10(3) UL (ref 0–0.7)
EOSINOPHIL NFR BLD AUTO: 2 %
ERYTHROCYTE [DISTWIDTH] IN BLOOD BY AUTOMATED COUNT: 13.9 %
FASTING PATIENT LIPID ANSWER: YES
FASTING STATUS PATIENT QL REPORTED: YES
GLOBULIN PLAS-MCNC: 3.7 G/DL (ref 2.8–4.4)
GLUCOSE BLD-MCNC: 98 MG/DL (ref 70–99)
HCT VFR BLD AUTO: 44.5 %
HDLC SERPL-MCNC: 43 MG/DL (ref 40–59)
HGB BLD-MCNC: 14.9 G/DL
IMM GRANULOCYTES # BLD AUTO: 0.02 X10(3) UL (ref 0–1)
IMM GRANULOCYTES NFR BLD: 0.3 %
LDLC SERPL CALC-MCNC: 99 MG/DL (ref ?–100)
LYMPHOCYTES # BLD AUTO: 1.56 X10(3) UL (ref 1–4)
LYMPHOCYTES NFR BLD AUTO: 21.8 %
MCH RBC QN AUTO: 31.8 PG (ref 26–34)
MCHC RBC AUTO-ENTMCNC: 33.5 G/DL (ref 31–37)
MCV RBC AUTO: 95.1 FL
MONOCYTES # BLD AUTO: 0.46 X10(3) UL (ref 0.1–1)
MONOCYTES NFR BLD AUTO: 6.4 %
NEUTROPHILS # BLD AUTO: 4.93 X10 (3) UL (ref 1.5–7.7)
NEUTROPHILS # BLD AUTO: 4.93 X10(3) UL (ref 1.5–7.7)
NEUTROPHILS NFR BLD AUTO: 69.1 %
NONHDLC SERPL-MCNC: 129 MG/DL (ref ?–130)
OSMOLALITY SERPL CALC.SUM OF ELEC: 289 MOSM/KG (ref 275–295)
PLATELET # BLD AUTO: 221 10(3)UL (ref 150–450)
POTASSIUM SERPL-SCNC: 4.4 MMOL/L (ref 3.5–5.1)
PROT SERPL-MCNC: 7.7 G/DL (ref 6.4–8.2)
RBC # BLD AUTO: 4.68 X10(6)UL
SODIUM SERPL-SCNC: 138 MMOL/L (ref 136–145)
TRIGL SERPL-MCNC: 170 MG/DL (ref 30–149)
TSI SER-ACNC: 2.59 MIU/ML (ref 0.36–3.74)
VLDLC SERPL CALC-MCNC: 28 MG/DL (ref 0–30)
WBC # BLD AUTO: 7.1 X10(3) UL (ref 4–11)

## 2022-02-08 PROCEDURE — 85025 COMPLETE CBC W/AUTO DIFF WBC: CPT | Performed by: FAMILY MEDICINE

## 2022-02-08 PROCEDURE — 84443 ASSAY THYROID STIM HORMONE: CPT | Performed by: FAMILY MEDICINE

## 2022-02-08 PROCEDURE — 3074F SYST BP LT 130 MM HG: CPT | Performed by: FAMILY MEDICINE

## 2022-02-08 PROCEDURE — G0439 PPPS, SUBSEQ VISIT: HCPCS | Performed by: FAMILY MEDICINE

## 2022-02-08 PROCEDURE — 3008F BODY MASS INDEX DOCD: CPT | Performed by: FAMILY MEDICINE

## 2022-02-08 PROCEDURE — 80061 LIPID PANEL: CPT | Performed by: FAMILY MEDICINE

## 2022-02-08 PROCEDURE — 3079F DIAST BP 80-89 MM HG: CPT | Performed by: FAMILY MEDICINE

## 2022-02-08 PROCEDURE — 96160 PT-FOCUSED HLTH RISK ASSMT: CPT | Performed by: FAMILY MEDICINE

## 2022-02-08 PROCEDURE — 80053 COMPREHEN METABOLIC PANEL: CPT | Performed by: FAMILY MEDICINE

## 2022-02-08 PROCEDURE — 99397 PER PM REEVAL EST PAT 65+ YR: CPT | Performed by: FAMILY MEDICINE

## 2022-02-24 ENCOUNTER — MED REC SCAN ONLY (OUTPATIENT)
Dept: FAMILY MEDICINE CLINIC | Facility: CLINIC | Age: 67
End: 2022-02-24

## 2022-03-25 ENCOUNTER — HOSPITAL ENCOUNTER (OUTPATIENT)
Dept: BONE DENSITY | Age: 67
Discharge: HOME | End: 2022-03-25
Attending: FAMILY MEDICINE
Payer: MEDICARE

## 2022-03-25 DIAGNOSIS — Z78.0 POSTMENOPAUSAL: ICD-10-CM

## 2022-03-25 DIAGNOSIS — M85.80 OSTEOPENIA, UNSPECIFIED LOCATION: ICD-10-CM

## 2022-03-25 DIAGNOSIS — Z82.62 FAMILY HISTORY OF OSTEOPOROSIS: ICD-10-CM

## 2022-03-25 PROCEDURE — 77080 DXA BONE DENSITY AXIAL: CPT | Performed by: FAMILY MEDICINE

## 2022-04-12 ENCOUNTER — TELEPHONE (OUTPATIENT)
Dept: FAMILY MEDICINE CLINIC | Facility: CLINIC | Age: 67
End: 2022-04-12

## 2022-05-16 RX ORDER — PRAVASTATIN SODIUM 40 MG
TABLET ORAL
Qty: 90 TABLET | Refills: 0 | Status: SHIPPED | OUTPATIENT
Start: 2022-05-16

## 2022-05-17 RX ORDER — LISINOPRIL AND HYDROCHLOROTHIAZIDE 12.5; 1 MG/1; MG/1
TABLET ORAL
Qty: 90 TABLET | Refills: 0 | Status: SHIPPED | OUTPATIENT
Start: 2022-05-17

## 2022-05-17 NOTE — TELEPHONE ENCOUNTER
LOV 02/08/2022    LAST LAB 02/08/2022    LAST RX  Lisinopril #90 R0 01/26/2022    Next OV No future appointments.     PROTOCOL   Hypertension Medications Protocol Passed 05/17/2022 08:52 AM   Protocol Details  CMP or BMP in past 12 months    Last serum creatinine< 2.0    Appointment in past 6 or next 3 months

## 2022-06-27 DIAGNOSIS — Z78.0 POST-MENOPAUSAL: ICD-10-CM

## 2022-06-27 DIAGNOSIS — N95.2 VAGINAL ATROPHY: ICD-10-CM

## 2022-06-27 RX ORDER — PRAVASTATIN SODIUM 40 MG
40 TABLET ORAL DAILY
Qty: 90 TABLET | Refills: 0 | Status: SHIPPED | OUTPATIENT
Start: 2022-06-27

## 2022-06-27 RX ORDER — LISINOPRIL AND HYDROCHLOROTHIAZIDE 12.5; 1 MG/1; MG/1
1 TABLET ORAL DAILY
Qty: 90 TABLET | Refills: 0 | Status: SHIPPED | OUTPATIENT
Start: 2022-06-27

## 2022-06-27 RX ORDER — ESTRADIOL 0.1 MG/G
1 CREAM VAGINAL
Qty: 42.5 G | Refills: 2 | Status: SHIPPED | OUTPATIENT
Start: 2022-06-27 | End: 2023-06-22

## 2022-06-27 NOTE — TELEPHONE ENCOUNTER
Hypertension Medications Protocol Passed 06/27/2022 03:47 PM   Protocol Details  CMP or BMP in past 12 months    Last serum creatinine< 2.0    Appointment in past 6 or next 3 months     Last refill - Lisinopril/HCTZ - 5/17/22 - #90 at a different pharmacy  Refilled per protocol    Cholesterol Medication Protocol Passed 06/27/2022 03:53 PM   Protocol Details  ALT < 80    ALT resulted within past year    Lipid panel within past 12 months    Appointment within past 12 or next 3 months     Last refill of Pravastatin - 5/16/22 - #90 at a different pharmacy  Refilled per protocol    Gynecology Medication Protocol Failed 06/27/2022 03:54 PM    PASS--PENDING LAST PAP WNL--VIA MANUAL LOOKUP    Mammogram in past 12 months    Physical or Pelvic/Breast in past 12 or next 3 mos--VIA MANUAL LOOKUP     Last refill of Estradiol - 6/7/21 - 42.5 g with 2 refills

## 2022-06-27 NOTE — TELEPHONE ENCOUNTER
REFILL LISINOPRIL-HYDROCHLOROTHIAZIDE, PRAVASTATIN, & Estradiol 0.1 MG/GM Vaginal Cream TO [facility], NEED SENT ASAP, GETTING LOW ON PRAVASTATIN

## 2022-07-06 ENCOUNTER — TELEPHONE (OUTPATIENT)
Dept: FAMILY MEDICINE CLINIC | Facility: CLINIC | Age: 67
End: 2022-07-06

## 2022-07-06 NOTE — TELEPHONE ENCOUNTER
Per Dr. Tom Echeevrria verbal recommendations, she may get her COVID vaccine after completing her quarantine or can also wait 30-45 days after sx onset since she will have natural immunity. Spouse verbalized understanding.

## 2022-08-09 ENCOUNTER — MED REC SCAN ONLY (OUTPATIENT)
Dept: FAMILY MEDICINE CLINIC | Facility: CLINIC | Age: 67
End: 2022-08-09

## 2022-08-13 RX ORDER — PRAVASTATIN SODIUM 40 MG
TABLET ORAL
Qty: 90 TABLET | Refills: 3 | Status: SHIPPED | OUTPATIENT
Start: 2022-08-13

## 2022-09-07 RX ORDER — LISINOPRIL AND HYDROCHLOROTHIAZIDE 12.5; 1 MG/1; MG/1
1 TABLET ORAL DAILY
Qty: 90 TABLET | Refills: 0 | Status: SHIPPED | OUTPATIENT
Start: 2022-09-07

## 2022-10-17 ENCOUNTER — TELEPHONE (OUTPATIENT)
Dept: FAMILY MEDICINE CLINIC | Facility: CLINIC | Age: 67
End: 2022-10-17

## 2022-10-17 NOTE — TELEPHONE ENCOUNTER
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 Reports nasal congestion, runny nose x 10 days. She has tried delsym and cloricidin without relief. She has not been tested for COVID, b/c she states she had COVID 1 yr ago. Advised that it is possible to get COVID more than once and sx may not always be the same. Encouraged pt to get COVID tested or go to the [facility] for further evaluation. No openings in-office until next week. Pt verbalized understanding.

## 2022-11-08 ENCOUNTER — LABORATORY ENCOUNTER (OUTPATIENT)
Dept: LAB | Age: 67
End: 2022-11-08
Attending: FAMILY MEDICINE
Payer: MEDICARE

## 2022-11-08 ENCOUNTER — OFFICE VISIT (OUTPATIENT)
Dept: FAMILY MEDICINE CLINIC | Facility: CLINIC | Age: 67
End: 2022-11-08
Payer: COMMERCIAL

## 2022-11-08 VITALS
OXYGEN SATURATION: 97 % | WEIGHT: 199 LBS | HEART RATE: 79 BPM | HEIGHT: 65 IN | TEMPERATURE: 98 F | DIASTOLIC BLOOD PRESSURE: 88 MMHG | RESPIRATION RATE: 12 BRPM | SYSTOLIC BLOOD PRESSURE: 138 MMHG | BODY MASS INDEX: 33.15 KG/M2

## 2022-11-08 DIAGNOSIS — J40 SINOBRONCHITIS: ICD-10-CM

## 2022-11-08 DIAGNOSIS — I10 ESSENTIAL HYPERTENSION: ICD-10-CM

## 2022-11-08 DIAGNOSIS — E78.2 MIXED HYPERLIPIDEMIA: ICD-10-CM

## 2022-11-08 DIAGNOSIS — J32.9 SINOBRONCHITIS: ICD-10-CM

## 2022-11-08 DIAGNOSIS — Z79.899 ENCOUNTER FOR LONG-TERM (CURRENT) USE OF MEDICATIONS: Primary | ICD-10-CM

## 2022-11-08 LAB
ALBUMIN SERPL-MCNC: 4 G/DL (ref 3.4–5)
ALBUMIN/GLOB SERPL: 1.3 {RATIO} (ref 1–2)
ALP LIVER SERPL-CCNC: 82 U/L
ALT SERPL-CCNC: 32 U/L
ANION GAP SERPL CALC-SCNC: 4 MMOL/L (ref 0–18)
AST SERPL-CCNC: 20 U/L (ref 15–37)
ATRIAL RATE: 70 BPM
BILIRUB SERPL-MCNC: 0.5 MG/DL (ref 0.1–2)
BUN BLD-MCNC: 18 MG/DL (ref 7–18)
CALCIUM BLD-MCNC: 9.1 MG/DL (ref 8.5–10.1)
CHLORIDE SERPL-SCNC: 106 MMOL/L (ref 98–112)
CHOLEST SERPL-MCNC: 147 MG/DL (ref ?–200)
CO2 SERPL-SCNC: 31 MMOL/L (ref 21–32)
CREAT BLD-MCNC: 0.98 MG/DL
FASTING PATIENT LIPID ANSWER: YES
FASTING STATUS PATIENT QL REPORTED: YES
GFR SERPLBLD BASED ON 1.73 SQ M-ARVRAT: 62 ML/MIN/1.73M2 (ref 60–?)
GLOBULIN PLAS-MCNC: 3.2 G/DL (ref 2.8–4.4)
GLUCOSE BLD-MCNC: 102 MG/DL (ref 70–99)
HDLC SERPL-MCNC: 44 MG/DL (ref 40–59)
LDLC SERPL CALC-MCNC: 78 MG/DL (ref ?–100)
NONHDLC SERPL-MCNC: 103 MG/DL (ref ?–130)
OSMOLALITY SERPL CALC.SUM OF ELEC: 294 MOSM/KG (ref 275–295)
P AXIS: 50 DEGREES
P-R INTERVAL: 170 MS
POTASSIUM SERPL-SCNC: 4.2 MMOL/L (ref 3.5–5.1)
PROT SERPL-MCNC: 7.2 G/DL (ref 6.4–8.2)
Q-T INTERVAL: 420 MS
QRS DURATION: 88 MS
QTC CALCULATION (BEZET): 453 MS
R AXIS: -40 DEGREES
SODIUM SERPL-SCNC: 141 MMOL/L (ref 136–145)
T AXIS: 13 DEGREES
TRIGL SERPL-MCNC: 144 MG/DL (ref 30–149)
VENTRICULAR RATE: 70 BPM
VLDLC SERPL CALC-MCNC: 22 MG/DL (ref 0–30)

## 2022-11-08 PROCEDURE — 3079F DIAST BP 80-89 MM HG: CPT | Performed by: FAMILY MEDICINE

## 2022-11-08 PROCEDURE — 99214 OFFICE O/P EST MOD 30 MIN: CPT | Performed by: FAMILY MEDICINE

## 2022-11-08 PROCEDURE — 80053 COMPREHEN METABOLIC PANEL: CPT

## 2022-11-08 PROCEDURE — 36415 COLL VENOUS BLD VENIPUNCTURE: CPT

## 2022-11-08 PROCEDURE — 1126F AMNT PAIN NOTED NONE PRSNT: CPT | Performed by: FAMILY MEDICINE

## 2022-11-08 PROCEDURE — 3008F BODY MASS INDEX DOCD: CPT | Performed by: FAMILY MEDICINE

## 2022-11-08 PROCEDURE — 93000 ELECTROCARDIOGRAM COMPLETE: CPT | Performed by: FAMILY MEDICINE

## 2022-11-08 PROCEDURE — 3075F SYST BP GE 130 - 139MM HG: CPT | Performed by: FAMILY MEDICINE

## 2022-11-08 PROCEDURE — 80061 LIPID PANEL: CPT

## 2022-11-08 RX ORDER — AMOXICILLIN AND CLAVULANATE POTASSIUM 875; 125 MG/1; MG/1
1 TABLET, FILM COATED ORAL 2 TIMES DAILY
Qty: 20 TABLET | Refills: 0 | Status: SHIPPED | OUTPATIENT
Start: 2022-11-08 | End: 2022-11-18

## 2022-11-08 RX ORDER — PANTOPRAZOLE SODIUM 40 MG/1
40 TABLET, DELAYED RELEASE ORAL
Qty: 90 TABLET | Refills: 1 | Status: SHIPPED | OUTPATIENT
Start: 2022-11-08 | End: 2023-05-07

## 2022-11-11 DIAGNOSIS — I10 ESSENTIAL HYPERTENSION: Primary | ICD-10-CM

## 2022-11-11 DIAGNOSIS — E78.2 MIXED HYPERLIPIDEMIA: ICD-10-CM

## 2022-11-28 ENCOUNTER — MED REC SCAN ONLY (OUTPATIENT)
Dept: FAMILY MEDICINE CLINIC | Facility: CLINIC | Age: 67
End: 2022-11-28

## 2022-11-28 RX ORDER — LISINOPRIL AND HYDROCHLOROTHIAZIDE 12.5; 1 MG/1; MG/1
1 TABLET ORAL DAILY
Qty: 90 TABLET | Refills: 1 | Status: SHIPPED | OUTPATIENT
Start: 2022-11-28

## 2022-11-28 NOTE — TELEPHONE ENCOUNTER
Hypertension Medications Protocol Passed 11/27/2022 09:43 PM   Protocol Details  CMP or BMP in past 12 months    Last serum creatinine< 2.0    Appointment in past 6 or next 3 months     Last refill - 9/7/22 - #90   Last CMP - 11/8/22 - creatinine - 0.98  Last office visit - 11/8/22  Refilled per protocol

## 2023-01-27 ENCOUNTER — HOSPITAL ENCOUNTER (OUTPATIENT)
Facility: HOSPITAL | Age: 68
Setting detail: HOSPITAL OUTPATIENT SURGERY
Discharge: HOME | End: 2023-01-27
Attending: INTERNAL MEDICINE | Admitting: INTERNAL MEDICINE
Payer: MEDICARE

## 2023-01-27 ENCOUNTER — ANESTHESIA EVENT (OUTPATIENT)
Dept: ENDOSCOPY | Facility: HOSPITAL | Age: 68
End: 2023-01-27
Payer: MEDICARE

## 2023-01-27 ENCOUNTER — ANESTHESIA (OUTPATIENT)
Dept: ENDOSCOPY | Facility: HOSPITAL | Age: 68
End: 2023-01-27
Payer: MEDICARE

## 2023-01-27 VITALS
SYSTOLIC BLOOD PRESSURE: 117 MMHG | BODY MASS INDEX: 31.58 KG/M2 | RESPIRATION RATE: 18 BRPM | DIASTOLIC BLOOD PRESSURE: 80 MMHG | WEIGHT: 185 LBS | HEART RATE: 72 BPM | HEIGHT: 64 IN | OXYGEN SATURATION: 96 %

## 2023-01-27 DIAGNOSIS — Z86.010 PERSONAL HISTORY OF COLONIC POLYPS: ICD-10-CM

## 2023-01-27 PROCEDURE — 88305 TISSUE EXAM BY PATHOLOGIST: CPT | Performed by: INTERNAL MEDICINE

## 2023-01-27 PROCEDURE — 0DBL8ZX EXCISION OF TRANSVERSE COLON, VIA NATURAL OR ARTIFICIAL OPENING ENDOSCOPIC, DIAGNOSTIC: ICD-10-PCS | Performed by: INTERNAL MEDICINE

## 2023-01-27 PROCEDURE — 0DBH8ZX EXCISION OF CECUM, VIA NATURAL OR ARTIFICIAL OPENING ENDOSCOPIC, DIAGNOSTIC: ICD-10-PCS | Performed by: INTERNAL MEDICINE

## 2023-01-27 RX ORDER — SODIUM CHLORIDE, SODIUM LACTATE, POTASSIUM CHLORIDE, CALCIUM CHLORIDE 600; 310; 30; 20 MG/100ML; MG/100ML; MG/100ML; MG/100ML
INJECTION, SOLUTION INTRAVENOUS CONTINUOUS
Status: DISCONTINUED | OUTPATIENT
Start: 2023-01-27 | End: 2023-01-27

## 2023-01-27 RX ADMIN — SODIUM CHLORIDE, SODIUM LACTATE, POTASSIUM CHLORIDE, CALCIUM CHLORIDE: 600; 310; 30; 20 INJECTION, SOLUTION INTRAVENOUS at 12:15:00

## 2023-01-27 NOTE — OPERATIVE REPORT
Colon operative report  Patient Name: Kristyn Mena  Procedure: Colonoscopy with snare polypectomy  Date of procedure: 1/27/2023    Indication: Personal history of colon polyps (The patient reports having had a villous adenoma resected at colonoscopy in [place])  Date of last colonoscopy: 7/29/2019  Attending: Luis Luis M.D. Consent: The risks, benefits, and alternatives were discussed with the patient / POA. Risks included, but were not limited to, bleeding, perforation, medication effects, cardiac arrhythmias, missed polyps, and aspiration. After all questions were answered to their satisfaction, a signed, informed, and witnessed consent was obtained. Timeout:  Prior to initiation of sedation, a formal timeout was performed, confirming the patient's name, date of birth, allergies, correct procedure, and need for antibiotics. The operating physician and sedating physician was also confirmed prior to initiation of sedation. Sedation: Monitored Anesthesia Care. Sedation: Monitored Anesthesia Care  Monitoring: Pulsoximetry, pulse, respirations, and blood pressure were monitored throughout the entire procedure    Preparation Quality: Adequate           [place] Prep Score:  Right: 2, Middle: 3, Left: 3    Total: 8  Procedure: After achieving adequate sedation, and placing the patient in the left lateral decubitus position, a digital rectal examination was performed. The lubricated tip of the pediatric colonoscope was then introduced into the rectum and advanced to the terminal ileum. The appendiceal orifice and ileocecal valve were clearly and distinctly visualized, thus verifying the cecum. The terminal ileum was intubated and found to be normal to the extent examined. The endoscope was then carefully withdrawn from the patient with careful visualization of the colonic mucosa revealing no additional pathologic findings.   Air was suctioned to the best of my ability, during withdrawal of the endoscope. When the endoscope reached the rectum, it was placed in a retroflexed position, and the rectal bulb was thus visualized. The endoscope was righted, and air was suctioned from the colon to the best of my ability, as it was during withdrawn from the colon. The endoscope was then removed from the patient. The patient tolerated the procedure without apparent procedural complications. The patient left the procedure room in stable condition for recovery. Findings: There were grade II internal hemorrhoids. There were no masses, fissures, fistulae, or external hemorrhoids. The mucosa of the colon  was normal, from the rectum to the cecum. There were no masses,  ulcers, or erosions. Mild diverticulosis was appreciated in the sigmoid colon, descending and distal transverse colon. In the cecum, a 5 mm sessile polyp (Nice II) was resected by cold snare, using the Exacto snare. In the mid-transverse colon, a 3 mm sessile polyp (Nice I) was resected by cold snare, using the Exacto snare. The appendiceal orifice and ileocecal valve were both clearly and distinctly visualized, thus verifying the cecum. The terminal ileum was intubated and the terminal ileal mucosa was found to be normal to the extent examined. Impression: Findings as above. Recommendations: Follow-up pathology  Repeat Colonoscopy Indicated: 5 years - due to her reported history of a villous adenoma and recurrent adenoma formation. She should then be followed not longer than 5 years, indefinitely, unless the villous adenoma history is refuted.

## 2023-01-27 NOTE — ANESTHESIA POSTPROCEDURE EVALUATION
[facility] Patient Status:  Hospital Outpatient Surgery   Age/Gender 69year old female MRN [id number]   Location [address] [address] Attending Anikt Kelley MD   Hosp Day # 0 PCP Lucila Gutierrez MD       Anesthesia Post-op Note    COLONOSCOPY w/ cold snare polypectomy    Procedure Summary     Date: 01/27/23 Room / Location: [facility] ENDOSCOPY 02 / [facility] ENDOSCOPY    Anesthesia Start: 1150 Anesthesia Stop: 9882    Procedure: COLONOSCOPY w/ cold snare polypectomy Diagnosis:       Personal history of colonic polyps      (Colon polyps, Diverticulosis, Hemorrhoids)    Surgeons: Ankit Kelley MD Anesthesiologist: David Melendez DO    Anesthesia Type: MAC ASA Status: 2          Anesthesia Type: MAC    Vitals Value Taken Time   /80 01/27/23 1240   Temp  01/27/23 1252   Pulse 76 01/27/23 1241   Resp 16 01/27/23 1252   SpO2 93 % 01/27/23 1241   Vitals shown include unvalidated device data. Patient Location: PACU    Anesthesia Type: MAC    Airway Patency: patent    Postop Pain Control: adequate    Mental Status: preanesthetic baseline    Nausea/Vomiting: none    Cardiopulmonary/Hydration status: stable euvolemic    Complications: no apparent anesthesia related complications    Postop vital signs: stable    Dental Exam: Unchanged from Preop    Patient to be discharged from PACU when criteria met.

## 2023-01-27 NOTE — DISCHARGE INSTRUCTIONS
Home Care Instructions for Colonoscopy with Sedation    Diet:  - Resume your regular diet as tolerated. - Start with light meals to minimize bloating.  - Do not drink alcohol today. Medication:  - If you have questions about resuming your normal medications, please contact your Primary Care Physician. Activities:  - Take it easy today. Do not return to work today. - Do not drive today. - Do not operate any machinery today (including kitchen equipment). Colonoscopy:  - You may notice some rectal \"spotting\" (a little blood on the toilet tissue) for a day or two after the exam. This is normal.  - If you experience any rectal bleeding (not spotting), persistent tenderness or sharp severe abdominal pains, oral temperature over 100 degrees Fahrenheit, light-headedness or dizziness, or any other problems, contact your doctor. **If unable to reach your doctor, please go to the [facility] Emergency Room**    - Your referring physician will receive a full report of your examination.  - If you do not hear from your doctor's office within two weeks of your biopsy, please call them for your results.

## 2023-02-27 ENCOUNTER — OFFICE VISIT (OUTPATIENT)
Dept: FAMILY MEDICINE CLINIC | Facility: CLINIC | Age: 68
End: 2023-02-27
Payer: COMMERCIAL

## 2023-02-27 VITALS
DIASTOLIC BLOOD PRESSURE: 60 MMHG | RESPIRATION RATE: 12 BRPM | WEIGHT: 198.13 LBS | SYSTOLIC BLOOD PRESSURE: 124 MMHG | TEMPERATURE: 98 F | HEIGHT: 63.25 IN | HEART RATE: 77 BPM | BODY MASS INDEX: 34.67 KG/M2 | OXYGEN SATURATION: 96 %

## 2023-02-27 DIAGNOSIS — I10 ESSENTIAL HYPERTENSION: ICD-10-CM

## 2023-02-27 DIAGNOSIS — Z00.00 ENCOUNTER FOR MEDICARE ANNUAL WELLNESS EXAM: Primary | ICD-10-CM

## 2023-02-27 DIAGNOSIS — E78.2 MIXED HYPERLIPIDEMIA: ICD-10-CM

## 2023-02-27 DIAGNOSIS — Z78.0 POST-MENOPAUSAL: ICD-10-CM

## 2023-02-27 DIAGNOSIS — D12.6 ADENOMATOUS POLYP OF COLON, UNSPECIFIED PART OF COLON: ICD-10-CM

## 2023-02-27 DIAGNOSIS — Z00.00 LABORATORY EXAMINATION ORDERED AS PART OF A ROUTINE GENERAL MEDICAL EXAMINATION: ICD-10-CM

## 2023-02-27 DIAGNOSIS — Z97.4 WEARS HEARING AID IN BOTH EARS: ICD-10-CM

## 2023-02-27 NOTE — ASSESSMENT & PLAN NOTE
As for her hypertension, Blood Pressure is well controlled, no significant medication side effects noted.    PLAN: will continue present medications, reviewed diet, exercise and weight control, and labs as ordered Detail Level: Detailed Detail Level: Zone

## 2023-04-10 ENCOUNTER — TELEPHONE (OUTPATIENT)
Dept: FAMILY MEDICINE CLINIC | Facility: CLINIC | Age: 68
End: 2023-04-10

## 2023-04-10 RX ORDER — AZITHROMYCIN 250 MG/1
TABLET, FILM COATED ORAL
Qty: 6 TABLET | Refills: 0 | Status: SHIPPED | OUTPATIENT
Start: 2023-04-10 | End: 2023-04-15

## 2023-04-10 NOTE — TELEPHONE ENCOUNTER
F/u w pt via phone. Recommended testing for CoViD  Informed pt of new RX ABtx. Reviewed medication w.  Pt who verbalized understanding

## 2023-04-10 NOTE — TELEPHONE ENCOUNTER
Pt called this am w c/o \"cold\" symptoms  Started Thursday w/ scratchy throat, progressed to nasal congestion/drainage, then cough. Continues to cough , productive w/ thick yellow sputum expectorated, + SOB w/ cough & exertion.  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

## 2023-04-10 NOTE — TELEPHONE ENCOUNTER
This sounds like it is viral based on the exposure to the grandson. I would still recommend getting a COVID test to prove or disprove if it is COVID. Because of the nasal congestion and drainage I will send in an antibiotic to see if that helps resolve the issue.     I will send the antibiotic to [facility]

## 2023-05-27 RX ORDER — LISINOPRIL AND HYDROCHLOROTHIAZIDE 12.5; 1 MG/1; MG/1
1 TABLET ORAL DAILY
Qty: 90 TABLET | Refills: 3 | Status: SHIPPED | OUTPATIENT
Start: 2023-05-27

## 2023-06-12 RX ORDER — PRAVASTATIN SODIUM 40 MG
TABLET ORAL
Qty: 100 TABLET | Refills: 2 | Status: SHIPPED | OUTPATIENT
Start: 2023-06-12

## 2023-06-12 NOTE — TELEPHONE ENCOUNTER
Pravastatin 40 MG oral tab     Cholesterol Medication Protocol Passed 06/12/2023 12:33 AM   Protocol Details  ALT < 80    ALT resulted within past year    Lipid panel within past 12 months    Appointment within past 12 or next 3 months        Last office visit:  2/27/23    No future appointments.   Last filled:  8/13/22   #90 with 3 refills   Last labs:   11/8/22  ALT: 32

## 2023-06-14 RX ORDER — PRAVASTATIN SODIUM 40 MG
TABLET ORAL
Qty: 100 TABLET | Refills: 2 | OUTPATIENT
Start: 2023-06-14

## 2023-07-17 ENCOUNTER — OFFICE VISIT (OUTPATIENT)
Dept: FAMILY MEDICINE CLINIC | Facility: CLINIC | Age: 68
End: 2023-07-17
Payer: COMMERCIAL

## 2023-07-17 ENCOUNTER — LABORATORY ENCOUNTER (OUTPATIENT)
Dept: LAB | Age: 68
End: 2023-07-17
Attending: FAMILY MEDICINE
Payer: MEDICARE

## 2023-07-17 VITALS
HEART RATE: 88 BPM | OXYGEN SATURATION: 95 % | RESPIRATION RATE: 12 BRPM | BODY MASS INDEX: 34.3 KG/M2 | TEMPERATURE: 98 F | DIASTOLIC BLOOD PRESSURE: 84 MMHG | SYSTOLIC BLOOD PRESSURE: 130 MMHG | WEIGHT: 196 LBS | HEIGHT: 63.25 IN

## 2023-07-17 DIAGNOSIS — J32.9 SINOBRONCHITIS: Primary | ICD-10-CM

## 2023-07-17 DIAGNOSIS — E78.2 MIXED HYPERLIPIDEMIA: ICD-10-CM

## 2023-07-17 DIAGNOSIS — J40 SINOBRONCHITIS: Primary | ICD-10-CM

## 2023-07-17 DIAGNOSIS — I10 ESSENTIAL HYPERTENSION: ICD-10-CM

## 2023-07-17 DIAGNOSIS — Z12.31 VISIT FOR SCREENING MAMMOGRAM: ICD-10-CM

## 2023-07-17 LAB
ALBUMIN SERPL-MCNC: 3.9 G/DL (ref 3.4–5)
ALBUMIN/GLOB SERPL: 1 {RATIO} (ref 1–2)
ALP LIVER SERPL-CCNC: 88 U/L
ALT SERPL-CCNC: 36 U/L
ANION GAP SERPL CALC-SCNC: 8 MMOL/L (ref 0–18)
AST SERPL-CCNC: 31 U/L (ref 15–37)
BILIRUB SERPL-MCNC: 0.5 MG/DL (ref 0.1–2)
BUN BLD-MCNC: 21 MG/DL (ref 7–18)
CALCIUM BLD-MCNC: 9.4 MG/DL (ref 8.5–10.1)
CHLORIDE SERPL-SCNC: 103 MMOL/L (ref 98–112)
CHOLEST SERPL-MCNC: 153 MG/DL (ref ?–200)
CO2 SERPL-SCNC: 27 MMOL/L (ref 21–32)
CREAT BLD-MCNC: 1.22 MG/DL
FASTING PATIENT LIPID ANSWER: YES
FASTING STATUS PATIENT QL REPORTED: YES
GFR SERPLBLD BASED ON 1.73 SQ M-ARVRAT: 48 ML/MIN/1.73M2 (ref 60–?)
GLOBULIN PLAS-MCNC: 4.1 G/DL (ref 2.8–4.4)
GLUCOSE BLD-MCNC: 106 MG/DL (ref 70–99)
HDLC SERPL-MCNC: 49 MG/DL (ref 40–59)
LDLC SERPL CALC-MCNC: 75 MG/DL (ref ?–100)
NONHDLC SERPL-MCNC: 104 MG/DL (ref ?–130)
OSMOLALITY SERPL CALC.SUM OF ELEC: 289 MOSM/KG (ref 275–295)
POTASSIUM SERPL-SCNC: 3.5 MMOL/L (ref 3.5–5.1)
PROT SERPL-MCNC: 8 G/DL (ref 6.4–8.2)
SODIUM SERPL-SCNC: 138 MMOL/L (ref 136–145)
TRIGL SERPL-MCNC: 169 MG/DL (ref 30–149)
VLDLC SERPL CALC-MCNC: 26 MG/DL (ref 0–30)

## 2023-07-17 PROCEDURE — 3008F BODY MASS INDEX DOCD: CPT | Performed by: FAMILY MEDICINE

## 2023-07-17 PROCEDURE — 36415 COLL VENOUS BLD VENIPUNCTURE: CPT

## 2023-07-17 PROCEDURE — 1170F FXNL STATUS ASSESSED: CPT | Performed by: FAMILY MEDICINE

## 2023-07-17 PROCEDURE — 80061 LIPID PANEL: CPT

## 2023-07-17 PROCEDURE — 1160F RVW MEDS BY RX/DR IN RCRD: CPT | Performed by: FAMILY MEDICINE

## 2023-07-17 PROCEDURE — 1126F AMNT PAIN NOTED NONE PRSNT: CPT | Performed by: FAMILY MEDICINE

## 2023-07-17 PROCEDURE — 1159F MED LIST DOCD IN RCRD: CPT | Performed by: FAMILY MEDICINE

## 2023-07-17 PROCEDURE — 99213 OFFICE O/P EST LOW 20 MIN: CPT | Performed by: FAMILY MEDICINE

## 2023-07-17 PROCEDURE — 3075F SYST BP GE 130 - 139MM HG: CPT | Performed by: FAMILY MEDICINE

## 2023-07-17 PROCEDURE — 3079F DIAST BP 80-89 MM HG: CPT | Performed by: FAMILY MEDICINE

## 2023-07-17 PROCEDURE — 80053 COMPREHEN METABOLIC PANEL: CPT

## 2023-07-17 RX ORDER — PREDNISONE 20 MG/1
TABLET ORAL
Qty: 15 TABLET | Refills: 0 | Status: SHIPPED | OUTPATIENT
Start: 2023-07-17 | End: 2023-07-27

## 2023-07-17 RX ORDER — AMOXICILLIN AND CLAVULANATE POTASSIUM 875; 125 MG/1; MG/1
1 TABLET, FILM COATED ORAL 2 TIMES DAILY
Qty: 20 TABLET | Refills: 0 | Status: SHIPPED | OUTPATIENT
Start: 2023-07-17 | End: 2023-07-27

## 2023-07-21 ENCOUNTER — TELEPHONE (OUTPATIENT)
Dept: FAMILY MEDICINE CLINIC | Facility: CLINIC | Age: 68
End: 2023-07-21

## 2023-07-21 DIAGNOSIS — I10 ESSENTIAL HYPERTENSION: ICD-10-CM

## 2023-07-21 DIAGNOSIS — E78.2 MIXED HYPERLIPIDEMIA: Primary | ICD-10-CM

## 2023-07-21 RX ORDER — BENZONATATE 100 MG/1
100 CAPSULE ORAL 3 TIMES DAILY PRN
Qty: 30 CAPSULE | Refills: 0 | Status: SHIPPED | OUTPATIENT
Start: 2023-07-21 | End: 2023-07-31

## 2023-07-21 NOTE — TELEPHONE ENCOUNTER
Left message on designated voicemail that prescription was sent and if to contact office if symptoms persist.     Patient notified of above and is in agreement with plan.

## 2023-07-21 NOTE — TELEPHONE ENCOUNTER
Pt still has a cough & it is keeping her up at night. She has tried Mucinex & Delsum but it doesn't help at night.

## 2023-09-14 RX ORDER — PANTOPRAZOLE SODIUM 40 MG/1
40 TABLET, DELAYED RELEASE ORAL
Qty: 90 TABLET | Refills: 1 | Status: SHIPPED | OUTPATIENT
Start: 2023-09-14 | End: 2024-03-12

## 2023-10-18 ENCOUNTER — HOSPITAL ENCOUNTER (OUTPATIENT)
Dept: MAMMOGRAPHY | Age: 68
Discharge: HOME | End: 2023-10-18
Attending: FAMILY MEDICINE
Payer: MEDICARE

## 2023-10-18 DIAGNOSIS — Z12.31 VISIT FOR SCREENING MAMMOGRAM: ICD-10-CM

## 2023-10-18 PROCEDURE — 77067 SCR MAMMO BI INCL CAD: CPT | Performed by: FAMILY MEDICINE

## 2023-10-18 PROCEDURE — 77063 BREAST TOMOSYNTHESIS BI: CPT | Performed by: FAMILY MEDICINE

## 2023-11-22 RX ORDER — PANTOPRAZOLE SODIUM 40 MG/1
40 TABLET, DELAYED RELEASE ORAL
Qty: 100 TABLET | Refills: 2 | Status: SHIPPED | OUTPATIENT
Start: 2023-11-22

## 2023-11-22 NOTE — TELEPHONE ENCOUNTER
LOV:7/17/23    LAST LAB:7/17/23    LAST RX:  pantoprazole 40 MG Oral Tab EC 90 tablet 1 9/14/2023 3/12/2024   Sig:   Take 1 tablet (40 mg total) by mouth every morning before breakfast.         Next OV: No future appointments.       PROTOCOL:none

## 2023-12-27 DIAGNOSIS — Z78.0 POST-MENOPAUSAL: ICD-10-CM

## 2023-12-27 DIAGNOSIS — N95.2 VAGINAL ATROPHY: ICD-10-CM

## 2023-12-27 RX ORDER — ESTRADIOL 0.1 MG/G
1 CREAM VAGINAL
Qty: 42.5 G | Refills: 3 | Status: SHIPPED | OUTPATIENT
Start: 2023-12-27

## 2023-12-27 NOTE — TELEPHONE ENCOUNTER
LOV:23  LAST LAB:23  LAST RX:  estradiol 0.1 MG/GM Vaginal Cream () 42.5 g 2 2022   Sig:   Place 1 g vaginally every 7 days.      Next OV:   Future Appointments   Date Time Provider Galilea Vickie   2024  1:00 PM Holli Iglesias MD [facility]   2024  1:45 PM REF [facility] REF [facility] Ref [facility]    PROTOCOL      Gynecology Medication Protocol [id number] 10:51 AM    PASS--PENDING LAST PAP WNL--VIA MANUAL LOOKUP    Mammogram in past 12 months    Physical or Pelvic/Breast in past 12 or next 3 mos--VIA MANUAL LOOKUP

## 2024-01-22 ENCOUNTER — HOSPITAL ENCOUNTER (OUTPATIENT)
Dept: GENERAL RADIOLOGY | Age: 69
Discharge: HOME | End: 2024-01-22
Attending: FAMILY MEDICINE
Payer: MEDICARE

## 2024-01-22 ENCOUNTER — LABORATORY ENCOUNTER (OUTPATIENT)
Dept: LAB | Age: 69
End: 2024-01-22
Attending: FAMILY MEDICINE
Payer: MEDICARE

## 2024-01-22 ENCOUNTER — OFFICE VISIT (OUTPATIENT)
Dept: FAMILY MEDICINE CLINIC | Facility: CLINIC | Age: 69
End: 2024-01-22
Payer: COMMERCIAL

## 2024-01-22 VITALS
WEIGHT: 202.5 LBS | BODY MASS INDEX: 34.57 KG/M2 | TEMPERATURE: 97 F | HEIGHT: 64 IN | HEART RATE: 79 BPM | DIASTOLIC BLOOD PRESSURE: 70 MMHG | SYSTOLIC BLOOD PRESSURE: 128 MMHG | RESPIRATION RATE: 12 BRPM | OXYGEN SATURATION: 97 %

## 2024-01-22 DIAGNOSIS — Z13.29 SCREENING FOR THYROID DISORDER: ICD-10-CM

## 2024-01-22 DIAGNOSIS — E66.9 OBESITY (BMI 30.0-34.9): ICD-10-CM

## 2024-01-22 DIAGNOSIS — N18.31 STAGE 3A CHRONIC KIDNEY DISEASE (CKD) (HCC): ICD-10-CM

## 2024-01-22 DIAGNOSIS — E78.2 MIXED HYPERLIPIDEMIA: ICD-10-CM

## 2024-01-22 DIAGNOSIS — E78.5 DYSLIPIDEMIA: ICD-10-CM

## 2024-01-22 DIAGNOSIS — I10 ESSENTIAL HYPERTENSION: ICD-10-CM

## 2024-01-22 DIAGNOSIS — D12.6 ADENOMATOUS POLYP OF COLON, UNSPECIFIED PART OF COLON: ICD-10-CM

## 2024-01-22 DIAGNOSIS — M25.551 BILATERAL HIP PAIN: ICD-10-CM

## 2024-01-22 DIAGNOSIS — Z79.899 ENCOUNTER FOR LONG-TERM (CURRENT) USE OF MEDICATIONS: ICD-10-CM

## 2024-01-22 DIAGNOSIS — Z97.4 WEARS HEARING AID IN BOTH EARS: ICD-10-CM

## 2024-01-22 DIAGNOSIS — Z13.6 SCREENING FOR CARDIOVASCULAR CONDITION: ICD-10-CM

## 2024-01-22 DIAGNOSIS — R30.0 DYSURIA: ICD-10-CM

## 2024-01-22 DIAGNOSIS — M25.552 BILATERAL HIP PAIN: ICD-10-CM

## 2024-01-22 DIAGNOSIS — Z13.1 SCREENING FOR DIABETES MELLITUS: ICD-10-CM

## 2024-01-22 DIAGNOSIS — Z00.00 ENCOUNTER FOR MEDICARE ANNUAL WELLNESS EXAM: Primary | ICD-10-CM

## 2024-01-22 DIAGNOSIS — Z78.0 POST-MENOPAUSAL: ICD-10-CM

## 2024-01-22 LAB
ALBUMIN SERPL-MCNC: 4.3 G/DL (ref 3.4–5)
ALBUMIN/GLOB SERPL: 1.2 {RATIO} (ref 1–2)
ALP LIVER SERPL-CCNC: 82 U/L
ANION GAP SERPL CALC-SCNC: 2 MMOL/L (ref 0–18)
APPEARANCE: CLEAR
AST SERPL-CCNC: 20 U/L (ref 15–37)
BILIRUB SERPL-MCNC: 0.6 MG/DL (ref 0.1–2)
BILIRUBIN: NEGATIVE
BUN BLD-MCNC: 18 MG/DL (ref 9–23)
CALCIUM BLD-MCNC: 9.2 MG/DL (ref 8.5–10.1)
CHLORIDE SERPL-SCNC: 106 MMOL/L (ref 98–112)
CHOLEST SERPL-MCNC: 159 MG/DL (ref ?–200)
CO2 SERPL-SCNC: 30 MMOL/L (ref 21–32)
CREAT BLD-MCNC: 1.07 MG/DL
EGFRCR SERPLBLD CKD-EPI 2021: 56 ML/MIN/1.73M2 (ref 60–?)
FASTING PATIENT LIPID ANSWER: YES
FASTING STATUS PATIENT QL REPORTED: YES
GLOBULIN PLAS-MCNC: 3.6 G/DL (ref 2.8–4.4)
GLUCOSE (URINE DIPSTICK): NEGATIVE MG/DL
GLUCOSE BLD-MCNC: 92 MG/DL (ref 70–99)
HDLC SERPL-MCNC: 42 MG/DL (ref 40–59)
KETONES (URINE DIPSTICK): NEGATIVE MG/DL
LDLC SERPL CALC-MCNC: 86 MG/DL (ref ?–100)
MULTISTIX LOT#: ABNORMAL NUMERIC
NITRITE, URINE: NEGATIVE
NONHDLC SERPL-MCNC: 117 MG/DL (ref ?–130)
OSMOLALITY SERPL CALC.SUM OF ELEC: 288 MOSM/KG (ref 275–295)
PH, URINE: 5.5 (ref 4.5–8)
POTASSIUM SERPL-SCNC: 3.8 MMOL/L (ref 3.5–5.1)
PROT SERPL-MCNC: 7.9 G/DL (ref 6.4–8.2)
PROTEIN (URINE DIPSTICK): NEGATIVE MG/DL
SODIUM SERPL-SCNC: 138 MMOL/L (ref 136–145)
SPECIFIC GRAVITY: 1 (ref 1–1.03)
TRIGL SERPL-MCNC: 178 MG/DL (ref 30–149)
TSI SER-ACNC: 3.23 MIU/ML (ref 0.36–3.74)
URINE-COLOR: YELLOW
UROBILINOGEN,SEMI-QN: 0.2 MG/DL (ref 0–1.9)
VLDLC SERPL CALC-MCNC: 29 MG/DL (ref 0–30)

## 2024-01-22 PROCEDURE — 1159F MED LIST DOCD IN RCRD: CPT | Performed by: FAMILY MEDICINE

## 2024-01-22 PROCEDURE — 99214 OFFICE O/P EST MOD 30 MIN: CPT | Performed by: FAMILY MEDICINE

## 2024-01-22 PROCEDURE — 84443 ASSAY THYROID STIM HORMONE: CPT

## 2024-01-22 PROCEDURE — 80053 COMPREHEN METABOLIC PANEL: CPT

## 2024-01-22 PROCEDURE — 3074F SYST BP LT 130 MM HG: CPT | Performed by: FAMILY MEDICINE

## 2024-01-22 PROCEDURE — 36415 COLL VENOUS BLD VENIPUNCTURE: CPT

## 2024-01-22 PROCEDURE — G0439 PPPS, SUBSEQ VISIT: HCPCS | Performed by: FAMILY MEDICINE

## 2024-01-22 PROCEDURE — 87086 URINE CULTURE/COLONY COUNT: CPT | Performed by: FAMILY MEDICINE

## 2024-01-22 PROCEDURE — 73523 X-RAY EXAM HIPS BI 5/> VIEWS: CPT | Performed by: FAMILY MEDICINE

## 2024-01-22 PROCEDURE — 3078F DIAST BP <80 MM HG: CPT | Performed by: FAMILY MEDICINE

## 2024-01-22 PROCEDURE — 99499 UNLISTED E&M SERVICE: CPT | Performed by: FAMILY MEDICINE

## 2024-01-22 PROCEDURE — 1160F RVW MEDS BY RX/DR IN RCRD: CPT | Performed by: FAMILY MEDICINE

## 2024-01-22 PROCEDURE — 1170F FXNL STATUS ASSESSED: CPT | Performed by: FAMILY MEDICINE

## 2024-01-22 PROCEDURE — 96160 PT-FOCUSED HLTH RISK ASSMT: CPT | Performed by: FAMILY MEDICINE

## 2024-01-22 PROCEDURE — 81003 URINALYSIS AUTO W/O SCOPE: CPT | Performed by: FAMILY MEDICINE

## 2024-01-22 PROCEDURE — 3008F BODY MASS INDEX DOCD: CPT | Performed by: FAMILY MEDICINE

## 2024-01-22 PROCEDURE — 80061 LIPID PANEL: CPT

## 2024-01-22 PROCEDURE — 1125F AMNT PAIN NOTED PAIN PRSNT: CPT | Performed by: FAMILY MEDICINE

## 2024-01-22 NOTE — ASSESSMENT & PLAN NOTE
As for her cholesterol, Lipids are well controlled, no significant medication side effects noted.   PLAN: will continue present medications, reviewed diet, exercise and weight control, and labs as ordered

## 2024-01-22 NOTE — ASSESSMENT & PLAN NOTE
As for her hypertension, Blood Pressure is well controlled, no significant medication side effects noted.   PLAN: will continue present medications, reviewed diet, exercise and weight control, and labs as ordered

## 2024-01-22 NOTE — PROGRESS NOTES
Subjective:   Lyric Bernardo is a 68 year old female who presents for a MA (Medicare Advantage) Supervisit (Once per calendar year) and scheduled follow up of multiple significant but stable problems.   BP shows good control with last BP of 128/70. Continue lifestyle changes, diet, exercise and weight loss.   Last K was 3.8 done on 1/22/2024.  Last Cr was 1.07 done on 1/22/2024.  Last eGFR was 56 on 1/22/2024.  BP Meds: lisinopril-hydroCHLOROthiazide Tabs - 10-12.5 MG   Cholesterol shows Good control. Long term heart-healthy diet and lifestyle discussed and encouraged to reduce risk of cardiovascular disease.  Cholesterol: 159, done on 1/22/2024.  HDL Cholesterol: 42, done on 1/22/2024.  TriGlycerides 178, done on 1/22/2024.  LDL Cholesterol: 86, done on 1/22/2024.   Cholesterol medications include PRAVASTATIN 40 MG Oral Tab [[id number]].         Bilateral hip pain and low back pain especially with stand fro sit  And with exercise    Has gained weight and cannot exercise without nsaid use    No x-ray done prior       Also states in October climbing many steps in the cold and wind and had chest pain and tightness   denies this with just 2 flights of stairs   After discussion it seems to be more musculoskeletal but should this continue we need to be alerted and we may do some heart tests as well.        History/Other:   Fall Risk Assessment:   She has been screened for Falls and is low risk.      Cognitive Assessment:   She had a completely normal cognitive assessment - see flowsheet entries     Functional Ability/Status:   Lyric Bernardo has a completely normal functional assessment. See flowsheet for details.      Depression Screening (PHQ-2/PHQ-9): PHQ-2 SCORE: 0  , done 1/22/2024             Advanced Directives:   She does NOT have a Living Will. [Do you have a living will?: No]  She does NOT have a Power of  for Health Care. [Do you have a healthcare power of ?: No]  Discussed Advance  Care Planning with patient (and family/surrogate if present). Standard forms made available to patient in After Visit Summary.      Patient Active Problem List   Diagnosis    Colon polyps    Essential hypertension    Mixed hyperlipidemia    Post-menopausal     Allergies:  She is allergic to compazine.    Current Medications:  Outpatient Medications Marked as Taking for the 1/22/24 encounter (Office Visit) with Jerald Palafox MD   Medication Sig    estradiol 0.1 MG/GM Vaginal Cream Place 1 g vaginally every 7 days.    pantoprazole 40 MG Oral Tab EC Take 1 tablet (40 mg total) by mouth before breakfast.    PRAVASTATIN 40 MG Oral Tab TAKE 1 TABLET BY MOUTH  DAILY    LISINOPRIL-HYDROCHLOROTHIAZIDE 10-12.5 MG Oral Tab TAKE 1 TABLET BY MOUTH DAILY    TURMERIC OR Take 1 tablet by mouth daily.    MULTIPLE VITAMINS-MINERALS-FA OR Take by mouth.    Coenzyme Q10 10 MG Oral Cap Take by mouth.    Calcium Carbonate-Vitamin D 600-400 MG-UNIT Oral Tab Take 1 tablet by mouth daily.       Medical History:  She  has a past medical history of Bleeding nose, Body piercing, Cancer ([facility]), COVID-19 (07/2022), Diarrhea, unspecified, Esophageal reflux, Flatulence/gas pain/belching, Headache disorder, Hearing impairment, Hearing loss, Heart palpitations, Hemorrhoids, High cholesterol, Indigestion, Other hemorrhoids (11/16/2017), Pain in joints, Painful urination, Problems with swallowing, Sputum production, Stress, Visual impairment, Wears glasses, and Weight gain.  Surgical History:  She  has a past surgical history that includes colonoscopy; sigmoidoscopy,diagnostic; total abdom hysterectomy; colonoscopy (N/A, 01/27/2023); and hysterectomy.   Family History:  Her family history includes Breast Cancer in her maternal cousin female; Diabetes in her father; Hypertension in her mother; Stroke in her maternal grandmother.  Social History:  She  reports that she has never smoked. She has never used smokeless tobacco. She reports that she does not  currently use alcohol. She reports that she does not use drugs.    Tobacco:  She has never smoked tobacco.    CAGE Alcohol Screen:   CAGE screening score of 0 on 1/22/2024, showing low risk of alcohol abuse.      Patient Care Team:  Jerald Palafox MD as PCP - General (Family Medicine)    Review of Systems  GENERAL: feels well otherwise  SKIN: denies any unusual skin lesions  EYES: denies blurred vision or double vision  HEENT: denies nasal congestion, sinus pain or ST  LUNGS: denies shortness of breath with exertion  CARDIOVASCULAR: denies chest pain on exertion see discussion in HPI above  GI: denies abdominal pain, denies heartburn  : denies dysuria, vaginal discharge or itching, no complaint of urinary incontinence   MUSCULOSKELETAL: has mild lo back pain--also has bilateral hip pain especially with stand from sit  needs to take advil for this  NEURO: denies headaches  PSYCHE: denies depression or anxiety  HEMATOLOGIC: denies hx of anemia  ENDOCRINE: denies thyroid history  ALL/ASTHMA: denies hx of allergy or asthma    Objective:   Physical Exam  General Appearance:  Alert, cooperative, no distress, appears stated age   Head:  Normocephalic, without obvious abnormality, atraumatic   Eyes:  PERRL, conjunctiva/corneas clear, EOM's intact both eyes   Ears:  Normal TM's and external ear canals, both ears   Nose: Nares normal, septum midline,mucosa normal, no drainage or sinus tenderness   Throat: Lips, mucosa, and tongue normal; teeth and gums normal   Neck: Supple, symmetrical, trachea midline, no adenopathy;  thyroid: not enlarged, symmetric, no tenderness/mass/nodules; no carotid bruit or JVD   Back:   Symmetric, no curvature, ROM normal, no CVA tenderness   Lungs:   Clear to auscultation bilaterally, respirations unlabored   Heart:  Regular rate and rhythm, S1 and S2 normal, no murmur, rub, or gallop   Abdomen:   Soft, non-tender, bowel sounds active all four quadrants,  no masses, no organomegaly   Pelvic:  Deferred   Extremities: Extremities normal, atraumatic, no cyanosis or edema  Gait is normal without assistance, she does complain of pain in the hip laterally as well as in the inguinal region.   Pulses: 2+ and symmetric   Skin: Skin color, texture, turgor normal, no rashes or lesions   Lymph nodes: Cervical, supraclavicular, and axillary nodes normal   Neurologic: Normal       /70 (BP Location: Right arm, Patient Position: Sitting, Cuff Size: large)   Pulse 79   Temp 97.4 °F (36.3 °C) (Temporal)   Resp 12   Ht 5' 4\" (1.626 m)   Wt 202 lb 8 oz (91.9 kg)   SpO2 97%   BMI 34.76 kg/m²  Estimated body mass index is 34.76 kg/m² as calculated from the following:    Height as of this encounter: 5' 4\" (1.626 m).    Weight as of this encounter: 202 lb 8 oz (91.9 kg).    Medicare Hearing Assessment:   Hearing Screening    Time taken: 1/22/2024  1:08 PM  Entry User: Taj Mccormack MA  Screening Method: Whisper Test  Whisper Test Result: Pass               Visual Acuity:   Right Eye Visual Acuity: Corrected Right Eye Chart Acuity: 20/30   Left Eye Visual Acuity: Corrected Left Eye Chart Acuity: 20/30   Both Eyes Visual Acuity: Corrected Both Eyes Chart Acuity: 20/30   Able To Tolerate Visual Acuity: Yes               PRELIMINARY RADIOGRAPH READING   Film reviewed by Jreald Palafox M.D.   Bilateral hips show no fracture or dislocation there is minimal to moderate degenerative arthritic changes in the acetabular area, no signs of avascular necrosis, pelvis is intact.    Assessment & Plan:   Lyric Bernardo is a 68 year old female who presents for a Medicare Assessment.     1. Encounter for Medicare annual wellness exam (Primary)  2. Encounter for long-term (current) use of medications  3. Adenomatous polyp of colon, unspecified part of colon  Overview:    Last colonoscopy in 2023  5 year follow up    4. Essential hypertension  Overview:  Blood Pressure and Cardiac Medications             LISINOPRIL-HYDROCHLOROTHIAZIDE 10-12.5 MG Oral Tab            Assessment & Plan:  As for her hypertension, Blood Pressure is well controlled, no significant medication side effects noted.   PLAN: will continue present medications, reviewed diet, exercise and weight control, and labs as ordered    Orders:  -     TSH W Reflex To Free T4; Future; Expected date: 01/22/2024  -     Comp Metabolic Panel (14); Future; Expected date: 01/22/2024  5. Mixed hyperlipidemia  Overview:  Cholesterol Lowering Medications            Pravastatin Sodium 40 MG Oral Tab            Assessment & Plan:  As for her cholesterol, Lipids are well controlled, no significant medication side effects noted.   PLAN: will continue present medications, reviewed diet, exercise and weight control, and labs as ordered        6. Post-menopausal  Assessment & Plan:   Stable    [x] chronic stable condition, noted historically, continues present status    7. Wears hearing aid in both ears  8. Dysuria  -     Urine Dip, auto without Micro  -     Urine Culture, Routine; Future; Expected date: 01/22/2024  -     Urine Culture, Routine  9. Screening for diabetes mellitus  -     Comp Metabolic Panel (14); Future; Expected date: 01/22/2024  10. Screening for thyroid disorder  -     TSH W Reflex To Free T4; Future; Expected date: 01/22/2024  11. Screening for cardiovascular condition  -     Lipid Panel; Future; Expected date: 01/22/2024  12. Dyslipidemia  -     TSH W Reflex To Free T4; Future; Expected date: 01/22/2024  -     Lipid Panel; Future; Expected date: 01/22/2024  13. Stage 3a chronic kidney disease (CKD) (HCC)  -     Comp Metabolic Panel (14); Future; Expected date: 01/22/2024  14. Bilateral hip pain  -     XR HIP W OR WO PELVIS(MIN 5 VIEWS),BILAT(CPT=73523); Future; Expected date: 01/22/2024  15. Obesity (BMI 30.0-34.9)    The patient indicates understanding of these issues and agrees to the plan.  Continue with current treatment plan.  Lab work  ordered.  Reinforced healthy diet, lifestyle, and exercise.      No follow-ups on file.     Jerald Palafox MD, 1/22/2024     Supplementary Documentation:   General Health:  In the past six months, have you lost more than 10 pounds without trying?: 2 - No  Has your appetite been poor?: No  Type of Diet: Balanced  How does the patient maintain a good energy level?: Daily Walks  How would you describe your daily physical activity?: Light  How would you describe your current health state?: Good  How do you maintain positive mental well-being?: Social Interaction;Puzzles;Games;Visiting Friends;Visiting Family  On a scale of 0 to 10, with 0 being no pain and 10 being severe pain, what is your pain level?: 4 - (Moderate)  In the past six months, have you experienced urine leakage?: 0-No  At any time do you feel concerned for the safety/well-being of yourself and/or your children, in your home or elsewhere?: No  Have you had any immunizations at another office such as Influenza, Hepatitis B, Tetanus, or Pneumococcal?: No       Lyric Bernardo's SCREENING SCHEDULE   Tests on this list are recommended by your physician but may not be covered, or covered at this frequency, by your insurer.   Please check with your insurance carrier before scheduling to verify coverage.   PREVENTATIVE SERVICES FREQUENCY &  COVERAGE DETAILS LAST COMPLETION DATE   Diabetes Screening    Fasting Blood Sugar /  Glucose    One screening every 12 months if never tested or if previously tested but not diagnosed with pre-diabetes   One screening every 6 months if diagnosed with pre-diabetes Lab Results   Component Value Date    GLU 92 01/22/2024        Cardiovascular Disease Screening    Lipid Panel  Cholesterol  Lipoprotein (HDL)  Triglycerides Covered every 5 years for all Medicare beneficiaries without apparent signs or symptoms of cardiovascular disease Lab Results   Component Value Date    CHOLEST 159 01/22/2024    HDL 42 01/22/2024    LDL 86  01/22/2024    TRIG 178 (H) 01/22/2024         Electrocardiogram (EKG)   Covered if needed at Welcome to Medicare, and non-screening if indicated for medical reasons 11/08/2022      Ultrasound Screening for Abdominal Aortic Aneurysm (AAA) Covered once in a lifetime for one of the following risk factors    Men who are 65-75 years old and have ever smoked    Anyone with a family history -     Colorectal Cancer Screening  Covered for ages 50-85; only need ONE of the following:    Colonoscopy   Covered every 10 years    Covered every 2 years if patient is at high risk or previous colonoscopy was abnormal 01/27/2023    Health Maintenance   Topic Date Due    Colorectal Cancer Screening  01/27/2028       Flexible Sigmoidoscopy   Covered every 4 years -    Fecal Occult Blood Test Covered annually -   Bone Density Screening    Bone density screening    Covered every 2 years after age 65 if diagnosed with risk of osteoporosis or estrogen deficiency.    Covered yearly for long-term glucocorticoid medication use (Steroids) Last Dexa Scan:    XR DEXA BONE DENSITOMETRY (CPT=77080) 03/25/2022      No recommendations at this time   Pap and Pelvic    Pap   Covered every 2 years for women at normal risk; Annually if at high risk -  No recommendations at this time    Chlamydia Annually if high risk -  No recommendations at this time   Screening Mammogram    Mammogram     Recommend annually for all female patients aged 40 and older    One baseline mammogram covered for patients aged 35-39 10/18/2023    Health Maintenance   Topic Date Due    Mammogram  10/18/2024       Immunizations    Influenza Covered once per flu season  Please get every year 09/17/2023  No recommendations at this time    Pneumococcal Each vaccine ([id number] & [id number]) covered once after 65 Prevnar 13: 10/22/2018    [id number]: 10/01/2019     No recommendations at this time    Hepatitis B One screening covered for patients with certain risk factors   -  No  recommendations at this time    Tetanus Toxoid Not covered by Medicare Part B unless medically necessary (cut with metal); may be covered with your pharmacy prescription benefits -    Tetanus, Diptheria and Pertusis TD and TDaP Not covered by Medicare Part B -  No recommendations at this time    Zoster Not covered by Medicare Part B; may be covered with your pharmacy  prescription benefits 11/01/2017  No recommendations at this time     Annual Monitoring of Persistent Medications (ACE/ARB, digoxin diuretics, anticonvulsants)    Potassium Annually Lab Results   Component Value Date    K 3.8 01/22/2024         Creatinine   Annually Lab Results   Component Value Date    CREATSERUM 1.07 (H) 01/22/2024         BUN Annually Lab Results   Component Value Date    BUN 18 01/22/2024       Drug Serum Conc Annually No results found for: \"DIGOXIN\", \"DIG\", \"VALP\"

## 2024-01-29 DIAGNOSIS — I10 ESSENTIAL HYPERTENSION: ICD-10-CM

## 2024-01-29 DIAGNOSIS — E78.2 MIXED HYPERLIPIDEMIA: Primary | ICD-10-CM

## 2024-01-29 DIAGNOSIS — Z13.1 SCREENING FOR DIABETES MELLITUS: ICD-10-CM

## 2024-01-29 DIAGNOSIS — Z13.6 SCREENING FOR CARDIOVASCULAR CONDITION: ICD-10-CM

## 2024-01-29 DIAGNOSIS — Z13.29 SCREENING FOR THYROID DISORDER: ICD-10-CM

## 2024-01-29 DIAGNOSIS — N18.31 STAGE 3A CHRONIC KIDNEY DISEASE (CKD) (HCC): ICD-10-CM

## 2024-01-29 DIAGNOSIS — E78.5 DYSLIPIDEMIA: ICD-10-CM

## 2024-02-21 RX ORDER — PRAVASTATIN SODIUM 40 MG
40 TABLET ORAL DAILY
Qty: 100 TABLET | Refills: 2 | Status: SHIPPED | OUTPATIENT
Start: 2024-02-21

## 2024-02-21 NOTE — TELEPHONE ENCOUNTER
LOV: 7/17/23  LAST LAB:1/22/24  LAST RX:    Medication Quantity Refills Start End   PRAVASTATIN 40 MG Oral Tab 100 tablet 2 6/12/2023 --   Sig:   TAKE 1 TABLET BY MOUTH  DAILY     Next OV: No future appointments.   PROTOCOL    Cholesterol Medication Protocol [id number]/21/2024 01:45 AM   Protocol Details ALT < 80    ALT resulted within past year    Lipid panel within past 12 months    In person appointment or virtual visit in the past 12 mos or appointment in next 3 mos

## 2024-03-02 RX ORDER — LISINOPRIL AND HYDROCHLOROTHIAZIDE 12.5; 1 MG/1; MG/1
1 TABLET ORAL DAILY
Qty: 100 TABLET | Refills: 2 | Status: SHIPPED | OUTPATIENT
Start: 2024-03-02

## 2024-07-03 ENCOUNTER — TELEPHONE (OUTPATIENT)
Dept: FAMILY MEDICINE CLINIC | Facility: CLINIC | Age: 69
End: 2024-07-03

## 2024-07-03 DIAGNOSIS — M25.551 BILATERAL HIP PAIN: Primary | ICD-10-CM

## 2024-07-03 DIAGNOSIS — M25.552 BILATERAL HIP PAIN: Primary | ICD-10-CM

## 2024-07-03 NOTE — TELEPHONE ENCOUNTER
Spoke with patient requesting referral to Pain Clinic through [facility] due to bilateral hip pain as discussed at office visit on 1-22-24.  States was advised they need referral before can schedule an appointment.    Referral placed please review

## 2024-11-04 ENCOUNTER — OFFICE VISIT (OUTPATIENT)
Dept: FAMILY MEDICINE CLINIC | Facility: CLINIC | Age: 69
End: 2024-11-04
Payer: COMMERCIAL

## 2024-11-04 VITALS
OXYGEN SATURATION: 96 % | SYSTOLIC BLOOD PRESSURE: 131 MMHG | WEIGHT: 196 LBS | HEART RATE: 80 BPM | TEMPERATURE: 97 F | RESPIRATION RATE: 12 BRPM | HEIGHT: 63.75 IN | BODY MASS INDEX: 33.87 KG/M2 | DIASTOLIC BLOOD PRESSURE: 82 MMHG

## 2024-11-04 DIAGNOSIS — Z12.31 VISIT FOR SCREENING MAMMOGRAM: ICD-10-CM

## 2024-11-04 DIAGNOSIS — S63.269A DISLOCATION OF METACARPOPHALANGEAL JOINT OF FINGER, INITIAL ENCOUNTER: ICD-10-CM

## 2024-11-04 DIAGNOSIS — J30.2 SEASONAL ALLERGIES: ICD-10-CM

## 2024-11-04 DIAGNOSIS — Z78.0 POSTMENOPAUSAL STATE: ICD-10-CM

## 2024-11-04 DIAGNOSIS — R04.0 RECURRENT EPISTAXIS: ICD-10-CM

## 2024-11-04 DIAGNOSIS — M79.641 PAIN OF RIGHT HAND: Primary | ICD-10-CM

## 2024-11-04 DIAGNOSIS — T88.1XXS: ICD-10-CM

## 2024-11-04 PROCEDURE — 1159F MED LIST DOCD IN RCRD: CPT | Performed by: FAMILY MEDICINE

## 2024-11-04 PROCEDURE — 3079F DIAST BP 80-89 MM HG: CPT | Performed by: FAMILY MEDICINE

## 2024-11-04 PROCEDURE — 3075F SYST BP GE 130 - 139MM HG: CPT | Performed by: FAMILY MEDICINE

## 2024-11-04 PROCEDURE — 3008F BODY MASS INDEX DOCD: CPT | Performed by: FAMILY MEDICINE

## 2024-11-04 PROCEDURE — 1160F RVW MEDS BY RX/DR IN RCRD: CPT | Performed by: FAMILY MEDICINE

## 2024-11-04 PROCEDURE — 1170F FXNL STATUS ASSESSED: CPT | Performed by: FAMILY MEDICINE

## 2024-11-04 PROCEDURE — 99214 OFFICE O/P EST MOD 30 MIN: CPT | Performed by: FAMILY MEDICINE

## 2024-11-06 ENCOUNTER — HOSPITAL ENCOUNTER (OUTPATIENT)
Dept: GENERAL RADIOLOGY | Age: 69
Discharge: HOME | End: 2024-11-06
Attending: FAMILY MEDICINE
Payer: MEDICARE

## 2024-11-06 DIAGNOSIS — M79.641 PAIN OF RIGHT HAND: ICD-10-CM

## 2024-11-06 PROCEDURE — 73130 X-RAY EXAM OF HAND: CPT | Performed by: FAMILY MEDICINE

## 2024-11-07 DIAGNOSIS — M79.641 PAIN OF RIGHT HAND: Primary | ICD-10-CM

## 2024-11-11 RX ORDER — PRAVASTATIN SODIUM 40 MG
40 TABLET ORAL DAILY
Qty: 100 TABLET | Refills: 2 | Status: SHIPPED | OUTPATIENT
Start: 2024-11-11

## 2024-11-11 RX ORDER — LISINOPRIL AND HYDROCHLOROTHIAZIDE 10; 12.5 MG/1; MG/1
1 TABLET ORAL DAILY
Qty: 100 TABLET | Refills: 2 | Status: SHIPPED | OUTPATIENT
Start: 2024-11-11

## 2024-11-11 NOTE — TELEPHONE ENCOUNTER
LOV: 1/22/24  LAST LAB: 1/22/24  LAST RX:  Medication Quantity Refills Start End   LISINOPRIL-HYDROCHLOROTHIAZIDE 10-12.5 MG Oral Tab 100 tablet 2 3/2/2024 --   Sig:   TAKE 1 TABLET BY MOUTH DAILY       pravastatin 40 MG Oral Tab 100 tablet 2 2/21/2024 --   Sig:   Take 1 tablet (40 mg total) by mouth daily.     Next OV :   Future Appointments   Date Time Provider Department Center   1/6/2025  2:20 PM Jerald Palafox MD [facility] [place]      PROTOCOL  Hypertension Medications Protocol [id number]/08/2024 09:09 PM   Protocol Details CMP or BMP in past 12 months    Last BP reading less than 140/90    In person appointment or virtual visit in the past 12 mos or appointment in next 3 mos    EGFRCR or GFRNAA > 50     Cholesterol Medication Protocol Ifcfyd1011/08/2024 09:09 PM   Protocol Details ALT < 80    ALT resulted within past year    Lipid panel within past 12 months    In person appointment or virtual visit in the past 12 mos or appointment in next 3 mos

## 2024-11-21 ENCOUNTER — HOSPITAL ENCOUNTER (OUTPATIENT)
Dept: MAMMOGRAPHY | Age: 69
Discharge: HOME | End: 2024-11-21
Attending: FAMILY MEDICINE
Payer: MEDICARE

## 2024-11-21 DIAGNOSIS — Z12.31 VISIT FOR SCREENING MAMMOGRAM: ICD-10-CM

## 2024-11-21 PROCEDURE — 77063 BREAST TOMOSYNTHESIS BI: CPT | Performed by: FAMILY MEDICINE

## 2024-11-21 PROCEDURE — 77067 SCR MAMMO BI INCL CAD: CPT | Performed by: FAMILY MEDICINE

## 2024-12-16 ENCOUNTER — HOSPITAL ENCOUNTER (OUTPATIENT)
Dept: BONE DENSITY | Age: 69
Discharge: HOME | End: 2024-12-16
Attending: FAMILY MEDICINE
Payer: MEDICARE

## 2024-12-16 DIAGNOSIS — Z78.0 POSTMENOPAUSAL STATE: ICD-10-CM

## 2024-12-16 PROCEDURE — 77080 DXA BONE DENSITY AXIAL: CPT | Performed by: FAMILY MEDICINE

## 2025-01-06 ENCOUNTER — LABORATORY ENCOUNTER (OUTPATIENT)
Dept: LAB | Age: 70
End: 2025-01-06
Attending: FAMILY MEDICINE
Payer: MEDICARE

## 2025-01-06 ENCOUNTER — OFFICE VISIT (OUTPATIENT)
Dept: FAMILY MEDICINE CLINIC | Facility: CLINIC | Age: 70
End: 2025-01-06
Payer: COMMERCIAL

## 2025-01-06 VITALS
TEMPERATURE: 97 F | OXYGEN SATURATION: 97 % | BODY MASS INDEX: 35 KG/M2 | HEART RATE: 71 BPM | RESPIRATION RATE: 12 BRPM | SYSTOLIC BLOOD PRESSURE: 124 MMHG | WEIGHT: 201 LBS | DIASTOLIC BLOOD PRESSURE: 80 MMHG

## 2025-01-06 DIAGNOSIS — M25.551 HIP PAIN, RIGHT: ICD-10-CM

## 2025-01-06 DIAGNOSIS — Z13.29 SCREENING FOR THYROID DISORDER: ICD-10-CM

## 2025-01-06 DIAGNOSIS — N18.31 STAGE 3A CHRONIC KIDNEY DISEASE (HCC): Chronic | ICD-10-CM

## 2025-01-06 DIAGNOSIS — E78.2 MIXED HYPERLIPIDEMIA: ICD-10-CM

## 2025-01-06 DIAGNOSIS — I10 ESSENTIAL HYPERTENSION: ICD-10-CM

## 2025-01-06 DIAGNOSIS — Z00.00 ENCOUNTER FOR ANNUAL HEALTH EXAMINATION: ICD-10-CM

## 2025-01-06 DIAGNOSIS — Z79.899 ENCOUNTER FOR LONG-TERM (CURRENT) USE OF MEDICATIONS: ICD-10-CM

## 2025-01-06 DIAGNOSIS — Z00.00 ENCOUNTER FOR ANNUAL HEALTH EXAMINATION: Primary | ICD-10-CM

## 2025-01-06 DIAGNOSIS — M67.951 TENDINOPATHY OF RIGHT GLUTEAL REGION: ICD-10-CM

## 2025-01-06 DIAGNOSIS — Z13.1 SCREENING FOR DIABETES MELLITUS: ICD-10-CM

## 2025-01-06 DIAGNOSIS — Z00.00 LABORATORY EXAMINATION ORDERED AS PART OF A ROUTINE GENERAL MEDICAL EXAMINATION: ICD-10-CM

## 2025-01-06 DIAGNOSIS — Z13.220 SCREENING, LIPID: ICD-10-CM

## 2025-01-06 PROBLEM — N18.30 CKD (CHRONIC KIDNEY DISEASE) STAGE 3, GFR 30-59 ML/MIN (HCC): Chronic | Status: ACTIVE | Noted: 2025-01-06

## 2025-01-06 LAB
ALBUMIN SERPL-MCNC: 4.6 G/DL (ref 3.2–4.8)
ALBUMIN/GLOB SERPL: 1.5 {RATIO} (ref 1–2)
ALP LIVER SERPL-CCNC: 81 U/L
ALT SERPL-CCNC: 19 U/L
ANION GAP SERPL CALC-SCNC: 6 MMOL/L (ref 0–18)
AST SERPL-CCNC: 20 U/L (ref ?–34)
BILIRUB SERPL-MCNC: 0.6 MG/DL (ref 0.2–1.1)
BUN BLD-MCNC: 17 MG/DL (ref 9–23)
CALCIUM BLD-MCNC: 9.8 MG/DL (ref 8.7–10.4)
CHLORIDE SERPL-SCNC: 103 MMOL/L (ref 98–112)
CHOLEST SERPL-MCNC: 155 MG/DL (ref ?–200)
CO2 SERPL-SCNC: 30 MMOL/L (ref 21–32)
CREAT BLD-MCNC: 0.9 MG/DL
EGFRCR SERPLBLD CKD-EPI 2021: 68 ML/MIN/1.73M2 (ref 60–?)
FASTING PATIENT LIPID ANSWER: YES
FASTING STATUS PATIENT QL REPORTED: YES
GLOBULIN PLAS-MCNC: 3.1 G/DL (ref 2–3.5)
GLUCOSE BLD-MCNC: 84 MG/DL (ref 70–99)
HDLC SERPL-MCNC: 40 MG/DL (ref 40–59)
LDLC SERPL CALC-MCNC: 82 MG/DL (ref ?–100)
NONHDLC SERPL-MCNC: 115 MG/DL (ref ?–130)
OSMOLALITY SERPL CALC.SUM OF ELEC: 289 MOSM/KG (ref 275–295)
POTASSIUM SERPL-SCNC: 3.5 MMOL/L (ref 3.5–5.1)
PROT SERPL-MCNC: 7.7 G/DL (ref 5.7–8.2)
SODIUM SERPL-SCNC: 139 MMOL/L (ref 136–145)
TRIGL SERPL-MCNC: 198 MG/DL (ref 30–149)
TSI SER-ACNC: 3.5 UIU/ML (ref 0.55–4.78)
VLDLC SERPL CALC-MCNC: 31 MG/DL (ref 0–30)

## 2025-01-06 PROCEDURE — 80061 LIPID PANEL: CPT

## 2025-01-06 PROCEDURE — 80053 COMPREHEN METABOLIC PANEL: CPT

## 2025-01-06 PROCEDURE — 36415 COLL VENOUS BLD VENIPUNCTURE: CPT

## 2025-01-06 PROCEDURE — 84443 ASSAY THYROID STIM HORMONE: CPT

## 2025-01-06 RX ORDER — PANTOPRAZOLE SODIUM 40 MG/1
40 TABLET, DELAYED RELEASE ORAL
Qty: 100 TABLET | Refills: 2 | Status: SHIPPED | OUTPATIENT
Start: 2025-01-06

## 2025-01-06 NOTE — PROGRESS NOTES
Subjective:   Lyric Bernardo is a 69 year old female who presents for a MA AHA (Medicare Advantage Annual Health Assessment) and Subsequent Annual Wellness visit (Pt already had Initial Annual Wellness) and scheduled follow up of multiple significant but stable problems.     Feels well  Has hip pain or tightness  May be gluteal issue    No known injury  Injections to trochanter no help  Considering p-t         History/Other:   Fall Risk Assessment:   She has been screened for Falls and is low risk.      Cognitive Assessment:   She had a completely normal cognitive assessment - see flowsheet entries     Functional Ability/Status:   Lyric Bernardo has some abnormal functions as listed below:  She has Hearing problems based on screening of functional status.      Depression Screening (PHQ):  PHQ-2 SCORE: 0  , done 1/5/2025             Advanced Directives:   She does have a Living Will but we do NOT have it on file in Epic.    She does have a POA but we do NOT have it on file in Epic.    Discussed Advance Care Planning with patient (and family/surrogate if present). Standard forms made available to patient in After Visit Summary.      Patient Active Problem List   Diagnosis    Colon polyps    Essential hypertension    Mixed hyperlipidemia    Post-menopausal    CKD (chronic kidney disease) stage 3, GFR 30-59 ml/min ([facility])     Allergies:  She is allergic to compazine.    Current Medications:  Outpatient Medications Marked as Taking for the 1/6/25 encounter (Office Visit) with Jerald Palafox MD   Medication Sig    pantoprazole 40 MG Oral Tab EC Take 1 tablet (40 mg total) by mouth before breakfast.    LISINOPRIL-HYDROCHLOROTHIAZIDE 10-12.5 MG Oral Tab TAKE 1 TABLET BY MOUTH DAILY    PRAVASTATIN 40 MG Oral Tab TAKE 1 TABLET BY MOUTH DAILY    estradiol 0.1 MG/GM Vaginal Cream Place 1 g vaginally every 7 days.    TURMERIC OR Take 1 tablet by mouth daily.    MULTIPLE VITAMINS-MINERALS-FA OR Take by mouth.     Coenzyme Q10 10 MG Oral Cap Take by mouth.    Calcium Carbonate-Vitamin D 600-400 MG-UNIT Oral Tab Take 1 tablet by mouth daily.       Medical History:  She  has a past medical history of Bleeding nose, Body piercing, Cancer (HCC), COVID-19 (07/2022), Diarrhea, unspecified, Esophageal reflux, Flatulence/gas pain/belching, Headache disorder, Hearing impairment, Hearing loss, Heart palpitations, Hemorrhoids, High cholesterol, Indigestion, Other hemorrhoids (11/16/2017), Pain in joints, Painful urination, Problems with swallowing, Sputum production, Stress, Visual impairment, Wears glasses, and Weight gain.  Surgical History:  She  has a past surgical history that includes colonoscopy; sigmoidoscopy,diagnostic; total abdom hysterectomy; colonoscopy (N/A, 01/27/2023); and hysterectomy.   Family History:  Her family history includes Breast Cancer in her maternal cousin female; Diabetes in her father; Hypertension in her mother; Stroke in her maternal grandmother.  Social History:  She  reports that she has never smoked. She has never used smokeless tobacco. She reports that she does not currently use alcohol. She reports that she does not use drugs.    Tobacco:  She has never smoked tobacco.    CAGE Alcohol Screen:   CAGE screening score of 0 on 1/5/2025, showing low risk of alcohol abuse.      Patient Care Team:  Jerald Palafox MD as PCP - General (Family Medicine)    Review of Systems  GENERAL: feels well otherwise  SKIN: denies any unusual skin lesions  EYES: denies blurred vision or double vision  HEENT: denies nasal congestion, sinus pain or ST  LUNGS: denies shortness of breath with exertion  CARDIOVASCULAR: denies chest pain on exertion  GI: denies abdominal pain, denies heartburn  : denies dysuria, vaginal discharge or itching, no complaint of urinary incontinence   MUSCULOSKELETAL: denies back pain  See HPI regarding hip pain    NEURO: denies headaches  PSYCHE: denies depression or anxiety  HEMATOLOGIC: denies  hx of anemia  ENDOCRINE: denies thyroid history  ALL/ASTHMA: denies hx of allergy or asthma    Objective:   Physical Exam  General Appearance:  Alert, cooperative, no distress, appears stated age   Head:  Normocephalic, without obvious abnormality, atraumatic   Eyes:  PERRL, conjunctiva/corneas clear, EOM's intact both eyes   Ears:  Normal TM's and external ear canals, both ears   Back:   Symmetric, no curvature, ROM normal, no CVA tenderness   Lungs:   Clear to auscultation bilaterally, respirations unlabored   Heart:  Regular rate and rhythm, S1 and S2 normal, no murmur, rub, or gallop   Abdomen:   Soft, non-tender, bowel sounds active all four quadrants,  no masses, no organomegaly   Pelvic: Deferred   Extremities: Extremities normal, atraumatic, no cyanosis or edema  Slightly limp   but uses no aid    Pulses: 2+ and symmetric   Skin: Skin color, texture, turgor normal, no rashes or lesions   Lymph nodes: Cervical, supraclavicular, and axillary nodes normal   Neurologic: Normal       /80 (BP Location: Left arm, Patient Position: Sitting, Cuff Size: large)   Pulse 71   Temp 96.9 °F (36.1 °C) (Temporal)   Resp 12   Wt 201 lb (91.2 kg)   SpO2 97%   BMI 34.77 kg/m²  Estimated body mass index is 34.77 kg/m² as calculated from the following:    Height as of 11/4/24: 5' 3.75\" (1.619 m).    Weight as of this encounter: 201 lb (91.2 kg).    Medicare Hearing Assessment:   Hearing Screening    Time taken: 1/6/2025  2:29 PM  Entry User: Taj Mccormack MA  Screening Method: Whisper Test  Whisper Test Result: Pass         Visual Acuity:   Right Eye Visual Acuity: Corrected Right Eye Chart Acuity: 20/30   Left Eye Visual Acuity: Corrected Left Eye Chart Acuity: 20/30   Both Eyes Visual Acuity: Corrected Both Eyes Chart Acuity: 20/30   Able To Tolerate Visual Acuity: Yes        Assessment & Plan:   Lyric Bernardo is a 69 year old female who presents for a Medicare Assessment.     Assessment &  Plan  Encounter for annual health examination    Orders:    Lipid Panel; Future; Expected date: 01/06/2025    Stage 3a chronic kidney disease (HCC)  Last Glomerular Filtration Rate: CKD 2 (GFR 68). .   1/6/2025: Creatinine 0.90 CKD etiologies : Hypertension  Plan/Management: Control Hypertension/Diabetes    Orders:    Comp Metabolic Panel (14); Future; Expected date: 01/06/2025    Hip pain, right  Refer for p-t   Orders:    OP REFERRAL TO [facility]    Essential hypertension  BP shows borderline control with last BP of 124/80. Work on lifestyle changes, diet, exercise and weight management.   1/6/2025: Potassium 3.5; Creatinine 0.90; eGFR-Cr 68  BP Meds: lisinopril-hydroCHLOROthiazide Tabs - 10-12.5 MG       Orders:    Comp Metabolic Panel (14); Future; Expected date: 01/06/2025    Mixed hyperlipidemia  Cholesterol shows Good control and Good compliance. Long term heart-healthy diet and lifestyle discussed and encouraged to reduce risk of cardiovascular disease.  1/6/2025: Cholesterol, Total 155; HDL Cholesterol 40; Triglycerides 198 (H); LDL Cholesterol 82  Cholesterol Meds: pravastatin Tabs - 40 MG  stable  Continue with current treatment plan    Orders:    Comp Metabolic Panel (14); Future; Expected date: 01/06/2025    Lipid Panel; Future; Expected date: 01/06/2025    Tendinopathy of right gluteal region  Ph therapy  Orders:    OP REFERRAL TO [facility]    Laboratory examination ordered as part of a routine general medical examination    Orders:    TSH W Reflex To Free T4; Future; Expected date: 01/06/2025    Comp Metabolic Panel (14); Future; Expected date: 01/06/2025    Lipid Panel; Future; Expected date: 01/06/2025    Screening for diabetes mellitus    Orders:    Comp Metabolic Panel (14); Future; Expected date: 01/06/2025    Screening for thyroid disorder    Orders:    TSH W Reflex To Free T4; Future; Expected date: 01/06/2025    Encounter for long-term (current) use  of medications               The patient indicates understanding of these issues and agrees to the plan.  Consult ordered.  Continue with current treatment plan.  Lab work ordered.  Reinforced healthy diet, lifestyle, and exercise.      No follow-ups on file.     Jerald Palafox MD, 1/6/2025     Supplementary Documentation:   General Health:  In the past six months, have you lost more than 10 pounds without trying?: (Patient-Rptd) 2 - No  Has your appetite been poor?: (Patient-Rptd) No  Type of Diet: (Patient-Rptd) Balanced  How does the patient maintain a good energy level?: (Patient-Rptd) Other  How would you describe your daily physical activity?: (Patient-Rptd) Light  How would you describe your current health state?: (Patient-Rptd) Good  How do you maintain positive mental well-being?: (Patient-Rptd) Social Interaction;Visiting Friends;Visiting Family  On a scale of 0 to 10, with 0 being no pain and 10 being severe pain, what is your pain level?: (Patient-Rptd) 3 - (Mild)  In the past six months, have you experienced urine leakage?: (Patient-Rptd) 0-No  At any time do you feel concerned for the safety/well-being of yourself and/or your children, in your home or elsewhere?: (Patient-Rptd) No  Have you had any immunizations at another office such as Influenza, Hepatitis B, Tetanus, or Pneumococcal?: (Patient-Rptd) Yes    Health Maintenance   Topic Date Due    Annual Well Visit  01/01/2025    Mammogram  11/21/2025    Colorectal Cancer Screening  01/27/2028    Influenza Vaccine  Completed    DEXA Scan  Completed    Annual Depression Screening  Completed    Fall Risk Screening (Annual)  Completed    Pneumococcal Vaccine: 65+ Years  Completed    Zoster Vaccines  Completed    COVID-19 Vaccine  Completed

## 2025-01-07 DIAGNOSIS — N18.31 STAGE 3A CHRONIC KIDNEY DISEASE (HCC): ICD-10-CM

## 2025-01-07 DIAGNOSIS — E78.2 MIXED HYPERLIPIDEMIA: Primary | ICD-10-CM

## 2025-01-07 DIAGNOSIS — Z13.29 SCREENING FOR THYROID DISORDER: ICD-10-CM

## 2025-01-07 DIAGNOSIS — I10 ESSENTIAL HYPERTENSION: ICD-10-CM

## 2025-01-07 DIAGNOSIS — Z13.1 SCREENING FOR DIABETES MELLITUS: ICD-10-CM

## 2025-01-09 ENCOUNTER — TELEPHONE (OUTPATIENT)
Dept: FAMILY MEDICINE CLINIC | Facility: CLINIC | Age: 70
End: 2025-01-09

## 2025-01-09 NOTE — ASSESSMENT & PLAN NOTE
Last Glomerular Filtration Rate: CKD 2 (GFR 68). .   1/6/2025: Creatinine 0.90 CKD etiologies : Hypertension  Plan/Management: Control Hypertension/Diabetes    Orders:    Comp Metabolic Panel (14); Future; Expected date: 01/06/2025

## 2025-01-09 NOTE — ASSESSMENT & PLAN NOTE
BP shows borderline control with last BP of 124/80. Work on lifestyle changes, diet, exercise and weight management.   1/6/2025: Potassium 3.5; Creatinine 0.90; eGFR-Cr 68  BP Meds: lisinopril-hydroCHLOROthiazide Tabs - 10-12.5 MG       Orders:    Comp Metabolic Panel (14); Future; Expected date: 01/06/2025

## 2025-01-09 NOTE — ASSESSMENT & PLAN NOTE
Cholesterol shows Good control and Good compliance. Long term heart-healthy diet and lifestyle discussed and encouraged to reduce risk of cardiovascular disease.  1/6/2025: Cholesterol, Total 155; HDL Cholesterol 40; Triglycerides 198 (H); LDL Cholesterol 82  Cholesterol Meds: pravastatin Tabs - 40 MG  stable  Continue with current treatment plan    Orders:    Comp Metabolic Panel (14); Future; Expected date: 01/06/2025    Lipid Panel; Future; Expected date: 01/06/2025

## 2025-01-09 NOTE — TELEPHONE ENCOUNTER
Patient was asked to bring in information that she saw on Facebook. Paper placed in Doctor's bin.

## 2025-01-23 ENCOUNTER — OFFICE VISIT (OUTPATIENT)
Dept: FAMILY MEDICINE CLINIC | Facility: CLINIC | Age: 70
End: 2025-01-23
Payer: COMMERCIAL

## 2025-01-23 VITALS
RESPIRATION RATE: 16 BRPM | HEART RATE: 82 BPM | HEIGHT: 63.75 IN | BODY MASS INDEX: 34.74 KG/M2 | OXYGEN SATURATION: 96 % | WEIGHT: 201 LBS | DIASTOLIC BLOOD PRESSURE: 86 MMHG | SYSTOLIC BLOOD PRESSURE: 127 MMHG | TEMPERATURE: 98 F

## 2025-01-23 DIAGNOSIS — J40 SINOBRONCHITIS: Primary | ICD-10-CM

## 2025-01-23 DIAGNOSIS — J98.4 PNEUMONITIS: ICD-10-CM

## 2025-01-23 DIAGNOSIS — J32.9 SINOBRONCHITIS: Primary | ICD-10-CM

## 2025-01-23 PROCEDURE — 3074F SYST BP LT 130 MM HG: CPT | Performed by: FAMILY MEDICINE

## 2025-01-23 PROCEDURE — 3079F DIAST BP 80-89 MM HG: CPT | Performed by: FAMILY MEDICINE

## 2025-01-23 PROCEDURE — 1160F RVW MEDS BY RX/DR IN RCRD: CPT | Performed by: FAMILY MEDICINE

## 2025-01-23 PROCEDURE — 3008F BODY MASS INDEX DOCD: CPT | Performed by: FAMILY MEDICINE

## 2025-01-23 PROCEDURE — 1159F MED LIST DOCD IN RCRD: CPT | Performed by: FAMILY MEDICINE

## 2025-01-23 PROCEDURE — 1170F FXNL STATUS ASSESSED: CPT | Performed by: FAMILY MEDICINE

## 2025-01-23 PROCEDURE — 99213 OFFICE O/P EST LOW 20 MIN: CPT | Performed by: FAMILY MEDICINE

## 2025-01-23 RX ORDER — PREDNISONE 20 MG/1
TABLET ORAL
Qty: 15 TABLET | Refills: 0 | Status: SHIPPED | OUTPATIENT
Start: 2025-01-23 | End: 2025-02-02

## 2025-01-23 NOTE — PROGRESS NOTES
Subjective:   Lyric Bernardo is a 69 year old female who presents for Cough       Fever:     Yes []     No [x]        Fatigue: Yes []     No [x]   Nasal congestion : Yes [x]     No []    minor  Sinus pressure: Yes [x]     No []      Sore throat:   Yes [x]      No []      Ear Pain:  Yes []      No [x]        Cough:    Yes [x]     No []    Slight              Dry [x]     Productive []   Shortness of breath:  Yes []   No [x]     Wheezing:   Yes []   No [x]      GI symptoms: Yes []     No [x]                     Nausea  []; Vomiting  []; Diarrhea  [] ; Upset stomach [];   Abdominal Pain  []      History/Other:   History of Present Illness  The patient, with a history of recurrent upper respiratory symptoms, presents with a recent onset of symptoms similar to her usual monthly episodes. She describes a scratchy sensation in her throat, postnasal drip, and a productive cough. The symptoms began on Monday with a scratchy throat, which resolved the following day. However, she developed a cough and increased nasal drainage. She denies fever, but reports a headache and laryngitis. She has been self-treating with over-the-counter Mucinex and Tylenol. She also reports a history of elevated triglycerides, which she manages with dietary modifications.   Chief Complaint Reviewed and Verified  No Further Nursing Notes to   Review  Tobacco Reviewed  Allergies Reviewed  Medications Reviewed    Problem List Reviewed  Medical History Reviewed  Surgical History   Reviewed  OB Status Reviewed  Family History Reviewed         Tobacco:  She has never smoked tobacco.    Current Outpatient Medications   Medication Sig Dispense Refill    amoxicillin clavulanate 875-125 MG Oral Tab Take 1 tablet by mouth 2 (two) times daily for 10 days. 20 tablet 0    predniSONE 20 MG Oral Tab 2 tabs daily 5 days 1 tab daily 5 days 15 tablet 0    pantoprazole 40 MG Oral Tab EC Take 1 tablet (40 mg total) by mouth before breakfast. 100 tablet 2     LISINOPRIL-HYDROCHLOROTHIAZIDE 10-12.5 MG Oral Tab TAKE 1 TABLET BY MOUTH DAILY 100 tablet 2    PRAVASTATIN 40 MG Oral Tab TAKE 1 TABLET BY MOUTH DAILY 100 tablet 2    estradiol 0.1 MG/GM Vaginal Cream Place 1 g vaginally every 7 days. 42.5 g 3    TURMERIC OR Take 1 tablet by mouth daily.      MULTIPLE VITAMINS-MINERALS-FA OR Take by mouth.      Coenzyme Q10 10 MG Oral Cap Take by mouth.      Calcium Carbonate-Vitamin D 600-400 MG-UNIT Oral Tab Take 1 tablet by mouth daily.           Review of Systems:  Pertinent items are noted in HPI.      Objective:   /86 (BP Location: Left arm, Patient Position: Sitting, Cuff Size: large)   Pulse 82   Temp 97.8 °F (36.6 °C) (Temporal)   Resp 16   Ht 5' 3.75\" (1.619 m)   Wt 201 lb (91.2 kg)   SpO2 96%   BMI 34.77 kg/m²  Estimated body mass index is 34.77 kg/m² as calculated from the following:    Height as of this encounter: 5' 3.75\" (1.619 m).    Weight as of this encounter: 201 lb (91.2 kg).  Results  LABS  Total cholesterol: 155 mg/dL (01/06/2025)  LDL: 82 mg/dL (01/06/2025)  Triglycerides: 198 mg/dL (01/06/2025)  HDL: 40 mg/dL (01/06/2025)     Physical Exam  HEENT: Postnasal drip observed, throat red and irritated.  CHEST: Congestion and crackles noted at the base of the right lung.  CARD RRR no murmur    Assessment & Plan:   1. Sinobronchitis (Primary)  -     Amoxicillin-Pot Clavulanate; Take 1 tablet by mouth 2 (two) times daily for 10 days.  Dispense: 20 tablet; Refill: 0  -     predniSONE; 2 tabs daily 5 days 1 tab daily 5 days  Dispense: 15 tablet; Refill: 0  2. Pneumonitis  -     predniSONE; 2 tabs daily 5 days 1 tab daily 5 days  Dispense: 15 tablet; Refill: 0    Assessment & Plan  Upper Respiratory Infection  Recurrent monthly episodes of postnasal drip, cough, and laryngitis. No fever. Current episode started on Monday with a scratchy throat, progressed to cough and laryngitis. Minimal productive cough. Examination reveals postnasal drip and  congestion at the base of the right lung.  -Prescribe antibiotic and mild steroid to reduce drainage and congestion.  -Continue over-the-counter Mucinex and Tylenol as needed.    Hypertriglyceridemia  Recent lab results showed triglycerides at 198, slightly above the upper limit of normal for this lab. Patient has no known history of hypertriglyceridemia.  -Advise patient to avoid fried foods and saturated fats.  -Reassure patient that current triglyceride level is not concerning and does not require medication.    Follow-up  Monitor symptoms and contact office if condition does not improve.        No follow-ups on file.        Jerald Palafox MD, 1/23/2025, 11:04 AM

## 2025-01-23 NOTE — PROGRESS NOTES
The following individual(s) Lyric Acevedolucaspaola  verbally consented to be recorded using ambient AI listening technology and understand that they can each withdraw their consent to this listening technology at any point by asking the clinician to turn off or pause the recording:

## 2025-02-17 ENCOUNTER — OFFICE VISIT (OUTPATIENT)
Dept: FAMILY MEDICINE CLINIC | Facility: CLINIC | Age: 70
End: 2025-02-17
Payer: COMMERCIAL

## 2025-02-17 VITALS
DIASTOLIC BLOOD PRESSURE: 82 MMHG | HEART RATE: 84 BPM | SYSTOLIC BLOOD PRESSURE: 128 MMHG | RESPIRATION RATE: 16 BRPM | HEIGHT: 63.75 IN | TEMPERATURE: 98 F | BODY MASS INDEX: 34.22 KG/M2 | OXYGEN SATURATION: 96 % | WEIGHT: 198 LBS

## 2025-02-17 DIAGNOSIS — J98.01 COUGH DUE TO BRONCHOSPASM: ICD-10-CM

## 2025-02-17 DIAGNOSIS — J00 ACUTE NASOPHARYNGITIS: Primary | ICD-10-CM

## 2025-02-17 PROCEDURE — 3079F DIAST BP 80-89 MM HG: CPT | Performed by: FAMILY MEDICINE

## 2025-02-17 PROCEDURE — 3074F SYST BP LT 130 MM HG: CPT | Performed by: FAMILY MEDICINE

## 2025-02-17 PROCEDURE — 1159F MED LIST DOCD IN RCRD: CPT | Performed by: FAMILY MEDICINE

## 2025-02-17 PROCEDURE — 1170F FXNL STATUS ASSESSED: CPT | Performed by: FAMILY MEDICINE

## 2025-02-17 PROCEDURE — 99213 OFFICE O/P EST LOW 20 MIN: CPT | Performed by: FAMILY MEDICINE

## 2025-02-17 PROCEDURE — 3008F BODY MASS INDEX DOCD: CPT | Performed by: FAMILY MEDICINE

## 2025-02-17 RX ORDER — PREDNISONE 20 MG/1
20 TABLET ORAL 2 TIMES DAILY
Qty: 10 TABLET | Refills: 0 | Status: SHIPPED | OUTPATIENT
Start: 2025-02-17 | End: 2025-02-22

## 2025-02-17 RX ORDER — ALBUTEROL SULFATE 90 UG/1
2 INHALANT RESPIRATORY (INHALATION) EVERY 4 HOURS PRN
Qty: 1 EACH | Refills: 0 | Status: SHIPPED | OUTPATIENT
Start: 2025-02-17

## 2025-02-17 RX ORDER — BENZONATATE 200 MG/1
200 CAPSULE ORAL 3 TIMES DAILY PRN
Qty: 15 CAPSULE | Refills: 0 | Status: SHIPPED | OUTPATIENT
Start: 2025-02-17

## 2025-02-17 NOTE — PATIENT INSTRUCTIONS
I discussed the viral nature of the illness as well as signs and symptoms of secondary bacterial infection  Push fluids, may use diphenhydramine at bedtime  Xlear nasal spray  Albuterol 2 puffs up to every 4 hours as needed for cough, wheeze, chest tightness  Prednisone 20 mg twice daily x 5 days  Tessalon 200 mg every 8 as needed cough  Call or follow-up if not significantly better over the next 72 hours

## 2025-02-17 NOTE — PROGRESS NOTES
HPI:   Lyric Bernardo is a 69 year old female who presents for upper respiratory symptoms for  6  days. Patient reports  clear to yellow colored nasal discharge, cough with scant yellow colored sputum, cough is keeping pt up at night, sinus pain, OTC cold meds have not been helping, denies fever.  Chief Complaint   Patient presents with    Sore Throat     X6 days.    Cough     X4 days       Current Outpatient Medications   Medication Sig Dispense Refill    pantoprazole 40 MG Oral Tab EC Take 1 tablet (40 mg total) by mouth before breakfast. 100 tablet 2    LISINOPRIL-HYDROCHLOROTHIAZIDE 10-12.5 MG Oral Tab TAKE 1 TABLET BY MOUTH DAILY 100 tablet 2    PRAVASTATIN 40 MG Oral Tab TAKE 1 TABLET BY MOUTH DAILY 100 tablet 2    estradiol 0.1 MG/GM Vaginal Cream Place 1 g vaginally every 7 days. 42.5 g 3    TURMERIC OR Take 1 tablet by mouth daily.      MULTIPLE VITAMINS-MINERALS-FA OR Take by mouth.      Coenzyme Q10 10 MG Oral Cap Take by mouth.      Calcium Carbonate-Vitamin D 600-400 MG-UNIT Oral Tab Take 1 tablet by mouth daily.        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    COVID-19    mild symptoms, low grade temps, headache, cough, lasted a week    Diarrhea, unspecified    Esophageal reflux    Flatulence/gas pain/belching    Headache disorder    Hearing impairment    hearing to both ears    Hearing loss    Heart palpitations    Hemorrhoids    High cholesterol    Indigestion    Other hemorrhoids    Pain in joints    Painful urination    Problems with swallowing    Sputum production    Stress    Visual impairment    glasses    Wears glasses    Weight gain      Past Surgical History:   Procedure Laterality Date    Colonoscopy      Colonoscopy N/A 01/27/2023    Procedure: COLONOSCOPY w/ cold snare polypectomy;  Surgeon: Leandro Osborn MD;  Location:  ENDOSCOPY    Hysterectomy      1998    Sigmoidoscopy,diagnostic      Total abdom hysterectomy         Family History   Problem Relation Age of Onset    Hypertension Mother     Diabetes Father     Stroke Maternal Grandmother     Breast Cancer Maternal Cousin Female         82      Social History     Socioeconomic History    Marital status:    Tobacco Use    Smoking status: Never    Smokeless tobacco: Never   Vaping Use    Vaping status: Never Used   Substance and Sexual Activity    Alcohol use: Not Currently     Comment: occasional    Drug use: Never     Social Drivers of Health     Food Insecurity: No Food Insecurity (2/17/2025)    NCSS - Food Insecurity     Worried About Running Out of Food in the Last Year: No     Ran Out of Food in the Last Year: No   Transportation Needs: No Transportation Needs (2/17/2025)    NCSS - Transportation     Lack of Transportation: No   Housing Stability: Not At Risk (2/17/2025)    NCSS - Housing/Utilities     Has Housing: Yes     Worried About Losing Housing: No     Unable to Get Utilities: No         REVIEW OF SYSTEMS:   GENERAL: fever: no, chills:no, fatigue:  +, not sleeping due to cough  SKIN: no rashes  EYES:denies itching, discharge, irritation  ENT:  see hpi, no ST just a tickle  LUNGS: no shortness of breath ,+ cough- worse supine, scant sputum, ? wheezing,no chest tightness  CARDIOVASCULAR: denies chest pain , palpatations  GI: no nausea or abdominal pain  NEURO: denies headaches    EXAM:   /82   Pulse 84   Temp 98.2 °F (36.8 °C) (Temporal)   Resp 16   Ht 5' 3.75\" (1.619 m)   Wt 198 lb (89.8 kg)   SpO2 96%   BMI 34.25 kg/m²   GENERAL: well developed, well nourished,in no apparent distress  SKIN: warm and dry, no rashes,   EYES:  conjunctiva are clear, lids and lashes normal  ENT: TMs: normal, Turbinates :congested, Discharge:scant, Pharynx: no erythema, Tonsils:neg, no tenderness over sinuses to palpation  NECK: supple,no adenopathy  LUNGS: CTA, no use of accessory muscles  CARDIO: RRR without murmur  ASSESSMENT AND PLAN:   Lyric Bernardo is a 69  year old female who presents with   Encounter Diagnoses   Name Primary?    Acute nasopharyngitis Yes    Cough due to bronchospasm      No orders of the defined types were placed in this encounter.    Meds & Refills for this Visit:  Requested Prescriptions     Signed Prescriptions Disp Refills    albuterol 108 (90 Base) MCG/ACT Inhalation Aero Soln 1 each 0     Sig: Inhale 2 puffs into the lungs every 4 (four) hours as needed for Wheezing or Shortness of Breath.    benzonatate 200 MG Oral Cap 15 capsule 0     Sig: Take 1 capsule (200 mg total) by mouth 3 (three) times daily as needed for cough.    predniSONE 20 MG Oral Tab 10 tablet 0     Sig: Take 1 tablet (20 mg total) by mouth 2 (two) times daily for 5 days.     Imaging & Consults:  None  No follow-ups on file.  Patient Instructions   I discussed the viral nature of the illness as well as signs and symptoms of secondary bacterial infection  Push fluids, may use diphenhydramine at bedtime  Xlear nasal spray  Albuterol 2 puffs up to every 4 hours as needed for cough, wheeze, chest tightness  Prednisone 20 mg twice daily x 5 days  Tessalon 200 mg every 8 as needed cough  Call or follow-up if not significantly better over the next 72 hours  Symptomatic treatment reviewed  Infection control reviewed  The patient indicates understanding of these issues and agrees to the plan.  The patient is asked to return if sx's persist or worsen.   Jose Carlos Marcus , DO

## 2025-07-22 RX ORDER — LISINOPRIL AND HYDROCHLOROTHIAZIDE 10; 12.5 MG/1; MG/1
1 TABLET ORAL DAILY
Qty: 90 TABLET | Refills: 0 | Status: SHIPPED | OUTPATIENT
Start: 2025-07-22

## 2025-07-22 RX ORDER — PRAVASTATIN SODIUM 40 MG
40 TABLET ORAL DAILY
Qty: 90 TABLET | Refills: 0 | Status: SHIPPED | OUTPATIENT
Start: 2025-07-22

## 2025-07-22 RX ORDER — PANTOPRAZOLE SODIUM 40 MG/1
40 TABLET, DELAYED RELEASE ORAL
Qty: 100 TABLET | Refills: 2 | Status: SHIPPED | OUTPATIENT
Start: 2025-07-22

## 2025-07-22 NOTE — TELEPHONE ENCOUNTER
pantoprazole 40 MG Oral Tab EC     Gastrointestional Medication Protocol [id number]/22/2025 03:04 PM   Protocol Details In person appointment or virtual visit in the past 12 mos or appointment in next 3 mos    Medication is active on med list      Last office visit: MAW: 1/6/25  Future Appointments   Date Time Provider Department Center   7/28/2025 11:40 AM Jerald Palafox MD [facility] [place]   7/28/2025 12:00 PM REF [facility]   Last filled: 1/6/25 #100 with 2 refills   Last labs: 1/6/25 lipid, CMP, TSH w/r FT4  Last BP:   BP Readings from Last 3 Encounters:   02/17/25 128/82   01/23/25 127/86   01/06/25 124/80

## 2025-07-22 NOTE — TELEPHONE ENCOUNTER
Last office visit: px: 1/6/25  No future appointments.    LISINOPRIL-HYDROCHLOROTHIAZIDE 10-12.5 MG Oral Tab     Hypertension Medications Protocol [id number]/18/2025 09:26 PM   Protocol Details CMP or BMP in past 12 months    Last BP reading less than 140/90    In person appointment or virtual visit in the past 12 mos or appointment in next 3 mos    EGFRCR or GFRNAA > 50    Medication is active on med list      Last filled: 11/11/24  #100 tab with 2 refills   Last labs: 1/6/25 eGFR-CR: 68  Last BP:   BP Readings from Last 3 Encounters:   02/17/25 128/82   01/23/25 127/86   01/06/25 124/80     PRAVASTATIN 40 MG Oral Tab     Cholesterol Medication Protocol [id number]/18/2025 09:26 PM   Protocol Details ALT < 80    ALT resulted within past year    Lipid panel within past 12 months    In person appointment or virtual visit in the past 12 mos or appointment in next 3 mos    Medication is active on med list      Last filled: 11/11/24 #100 tabs with 2 refills   Last labs: 1/6/25 ALT: 19  Last BP:   BP Readings from Last 3 Encounters:   02/17/25 128/82   01/23/25 127/86   01/06/25 124/80     Called Lyric and spoke with her let her know that she is due for a medication follow up and labs.  Future Appointments   Date Time Provider Department Center   7/28/2025 11:40 AM Jerald Palafox MD [facility] [place]   7/28/2025 12:00 PM REF [facility]

## 2025-07-28 ENCOUNTER — OFFICE VISIT (OUTPATIENT)
Dept: FAMILY MEDICINE CLINIC | Facility: CLINIC | Age: 70
End: 2025-07-28
Payer: COMMERCIAL

## 2025-07-28 ENCOUNTER — LABORATORY ENCOUNTER (OUTPATIENT)
Dept: LAB | Age: 70
End: 2025-07-28
Attending: FAMILY MEDICINE

## 2025-07-28 VITALS
WEIGHT: 203.5 LBS | OXYGEN SATURATION: 99 % | RESPIRATION RATE: 18 BRPM | SYSTOLIC BLOOD PRESSURE: 118 MMHG | BODY MASS INDEX: 35 KG/M2 | HEART RATE: 76 BPM | TEMPERATURE: 98 F | DIASTOLIC BLOOD PRESSURE: 72 MMHG

## 2025-07-28 DIAGNOSIS — M79.89 SWOLLEN FEET: Primary | ICD-10-CM

## 2025-07-28 DIAGNOSIS — R06.09 EXERTIONAL DYSPNEA: ICD-10-CM

## 2025-07-28 DIAGNOSIS — N18.31 STAGE 3A CHRONIC KIDNEY DISEASE (HCC): ICD-10-CM

## 2025-07-28 DIAGNOSIS — I10 ESSENTIAL HYPERTENSION: ICD-10-CM

## 2025-07-28 DIAGNOSIS — E78.2 MIXED HYPERLIPIDEMIA: ICD-10-CM

## 2025-07-28 DIAGNOSIS — Z13.1 SCREENING FOR DIABETES MELLITUS: ICD-10-CM

## 2025-07-28 DIAGNOSIS — R19.5 LOOSE STOOLS: ICD-10-CM

## 2025-07-28 LAB
ALBUMIN SERPL-MCNC: 4.5 G/DL (ref 3.2–4.8)
ALBUMIN/GLOB SERPL: 1.5 (ref 1–2)
ALP LIVER SERPL-CCNC: 83 U/L (ref 55–142)
ALT SERPL-CCNC: 28 U/L (ref 10–49)
ANION GAP SERPL CALC-SCNC: 11 MMOL/L (ref 0–18)
AST SERPL-CCNC: 29 U/L (ref ?–34)
BILIRUB SERPL-MCNC: 0.7 MG/DL (ref 0.2–1.1)
BUN BLD-MCNC: 14 MG/DL (ref 9–23)
CALCIUM BLD-MCNC: 9.4 MG/DL (ref 8.7–10.6)
CHLORIDE SERPL-SCNC: 104 MMOL/L (ref 98–112)
CHOLEST SERPL-MCNC: 139 MG/DL (ref ?–200)
CO2 SERPL-SCNC: 27 MMOL/L (ref 21–32)
CREAT BLD-MCNC: 1.03 MG/DL (ref 0.55–1.02)
EGFRCR SERPLBLD CKD-EPI 2021: 58 ML/MIN/1.73M2 (ref 60–?)
FASTING PATIENT LIPID ANSWER: YES
FASTING STATUS PATIENT QL REPORTED: YES
GLOBULIN PLAS-MCNC: 3.1 G/DL (ref 2–3.5)
GLUCOSE BLD-MCNC: 87 MG/DL (ref 70–99)
HDLC SERPL-MCNC: 40 MG/DL (ref 40–59)
LDLC SERPL CALC-MCNC: 61 MG/DL (ref ?–100)
NONHDLC SERPL-MCNC: 99 MG/DL (ref ?–130)
OSMOLALITY SERPL CALC.SUM OF ELEC: 294 MOSM/KG (ref 275–295)
POTASSIUM SERPL-SCNC: 3.8 MMOL/L (ref 3.5–5.1)
PROT SERPL-MCNC: 7.6 G/DL (ref 5.7–8.2)
SODIUM SERPL-SCNC: 142 MMOL/L (ref 136–145)
TRIGL SERPL-MCNC: 233 MG/DL (ref 30–149)
VLDLC SERPL CALC-MCNC: 35 MG/DL (ref 0–30)

## 2025-07-28 PROCEDURE — 99214 OFFICE O/P EST MOD 30 MIN: CPT | Performed by: FAMILY MEDICINE

## 2025-07-28 PROCEDURE — G2211 COMPLEX E/M VISIT ADD ON: HCPCS | Performed by: FAMILY MEDICINE

## 2025-07-28 PROCEDURE — 3078F DIAST BP <80 MM HG: CPT | Performed by: FAMILY MEDICINE

## 2025-07-28 PROCEDURE — 1159F MED LIST DOCD IN RCRD: CPT | Performed by: FAMILY MEDICINE

## 2025-07-28 PROCEDURE — 1170F FXNL STATUS ASSESSED: CPT | Performed by: FAMILY MEDICINE

## 2025-07-28 PROCEDURE — 80061 LIPID PANEL: CPT

## 2025-07-28 PROCEDURE — 3074F SYST BP LT 130 MM HG: CPT | Performed by: FAMILY MEDICINE

## 2025-07-28 PROCEDURE — 36415 COLL VENOUS BLD VENIPUNCTURE: CPT

## 2025-07-28 PROCEDURE — 1160F RVW MEDS BY RX/DR IN RCRD: CPT | Performed by: FAMILY MEDICINE

## 2025-07-28 PROCEDURE — 80053 COMPREHEN METABOLIC PANEL: CPT

## 2025-07-28 RX ORDER — FUROSEMIDE 20 MG/1
20 TABLET ORAL DAILY
Qty: 30 TABLET | Refills: 0 | Status: SHIPPED | OUTPATIENT
Start: 2025-07-28 | End: 2025-08-27

## 2025-07-28 RX ORDER — DICYCLOMINE HYDROCHLORIDE 10 MG/1
10 CAPSULE ORAL 3 TIMES DAILY PRN
Qty: 40 CAPSULE | Refills: 3 | Status: SHIPPED | OUTPATIENT
Start: 2025-07-28

## 2025-07-28 NOTE — PROGRESS NOTES
Subjective:   Lyric Bernardo is a 69 year old female who presents for Medication Follow-Up       History/Other:   History of Present Illness  Lyric Bernardo is a 69 year old female who presents with foot swelling and shortness of breath.    She has significant swelling in her feet, particularly the right foot, which worsens with heat and certain footwear. The swelling is severe enough to require cutting off shoes and elevating her feet with ice to reduce it. The swelling is exacerbated by wearing shoes with straps or pressure across the top of the foot, and she has had to forgo activities such as attending a concert due to the severity of the swelling.    She experiences shortness of breath, particularly when walking outside or climbing stairs, which has been occurring for the past five to six months. The shortness of breath resolves quickly upon resting. No associated cough or chest pain, and she is able to sleep on her side without difficulty.    She mentions gastrointestinal issues, particularly after consuming certain foods or drinks. She experiences frequent bowel movements after consuming coffee from certain restaurants or greasy foods, which she describes as similar to preparing for a colonoscopy. This has been a recent development, and she is unsure of the specific triggers.    Her current medications include Pepcid and pantoprazole, which she takes occasionally for gastrointestinal symptoms. She also drinks a cup of hot tea at night before bed. No cough, chest pain, or difficulty lying flat at night. She reports getting up once or twice at night to urinate, depending on fluid intake.   Chief Complaint Reviewed and Verified  No Further Nursing Notes to   Review  Tobacco Reviewed  Allergies Reviewed  Medications Reviewed    Problem List Reviewed  Medical History Reviewed  Surgical History   Reviewed  Family History Reviewed         Tobacco:  She has never smoked tobacco.    Current  Medications[1]         Review of Systems:  Pertinent items are noted in HPI.      Objective:   /72   Pulse 76   Temp 98.2 °F (36.8 °C) (Temporal)   Resp 18   Wt 203 lb 8 oz (92.3 kg)   SpO2 99%   BMI 35.21 kg/m²  Estimated body mass index is 35.21 kg/m² as calculated from the following:    Height as of 2/17/25: 5' 3.75\" (1.619 m).    Weight as of this encounter: 203 lb 8 oz (92.3 kg).  Results       Physical Exam  GENERAL: Alert, cooperative, well developed, no acute distress.  HEENT: Normocephalic, normal oropharynx, moist mucous membranes.  CHEST: Clear to auscultation bilaterally, no wheezes, rhonchi, or crackles.  CARDIOVASCULAR: Normal heart rate and rhythm, S1 and S2 normal without murmurs, rubs, or gallops.  ABDOMEN: Soft, non-tender, non-distended, without organomegaly, normal bowel sounds.  EXTREMITIES: No cyanosis --trace pedal no ankle edema      Assessment & Plan:   1. Swollen feet (Primary)  -     Furosemide; Take 1 tablet (20 mg total) by mouth daily.  Dispense: 30 tablet; Refill: 0  2. Exertional dyspnea  -     CARD ECHO 2D DOPPLER (CPT=93306); Future; Expected date: 07/28/2025  3. Loose stools  -     Dicyclomine HCl; Take 1 capsule (10 mg total) by mouth 3 (three) times daily as needed.  Dispense: 40 capsule; Refill: 3    Assessment & Plan  Peripheral Edema  Significant swelling in feet, especially right foot. Suspected fluid retention due to heat and dietary factors. Blood pressure controlled, no heart failure signs.  - Prescribed morning diuretic for fluid management.  - Advised monitoring salt intake, especially from summer foods.    Dyspnea on Exertion  Shortness of breath during exertion, resolving with rest. Possible relation to weight gain. Lungs clear, no heart murmurs.  - Ordered echocardiogram to assess cardiac function.    Irritable Bowel Syndrome (IBS)  Gastrointestinal symptoms triggered by greasy foods, suggesting IBS. Discussed gallbladder function and dietary triggers.  -  Recommended probiotic to normalize gut alon.  - Prescribed dicyclomine for symptom management as needed.        No follow-ups on file.        Jerald Palafox MD, 7/28/2025, 11:49 AM             [1]   Current Outpatient Medications   Medication Sig Dispense Refill    dicyclomine 10 MG Oral Cap Take 1 capsule (10 mg total) by mouth 3 (three) times daily as needed. 40 capsule 3    furosemide 20 MG Oral Tab Take 1 tablet (20 mg total) by mouth daily. 30 tablet 0    lisinopril-hydroCHLOROthiazide 10-12.5 MG Oral Tab TAKE 1 TABLET BY MOUTH DAILY 90 tablet 0    pravastatin 40 MG Oral Tab TAKE 1 TABLET BY MOUTH DAILY 90 tablet 0    pantoprazole 40 MG Oral Tab EC Take 1 tablet (40 mg total) by mouth before breakfast. 100 tablet 2    albuterol 108 (90 Base) MCG/ACT Inhalation Aero Soln Inhale 2 puffs into the lungs every 4 (four) hours as needed for Wheezing or Shortness of Breath. 1 each 0    estradiol 0.1 MG/GM Vaginal Cream Place 1 g vaginally every 7 days. 42.5 g 3    MULTIPLE VITAMINS-MINERALS-FA OR Take by mouth.      Coenzyme Q10 10 MG Oral Cap Take by mouth.      Calcium Carbonate-Vitamin D 600-400 MG-UNIT Oral Tab Take 1 tablet by mouth daily.      benzonatate 200 MG Oral Cap Take 1 capsule (200 mg total) by mouth 3 (three) times daily as needed for cough. 15 capsule 0    TURMERIC OR Take 1 tablet by mouth daily.

## 2025-07-28 NOTE — PROGRESS NOTES
The following individual(s) verbally consented to be recorded using ambient AI listening technology and understand that they can each withdraw their consent to this listening technology at any point by asking the clinician to turn off or pause the recording:    Patient name: Lyric Hillfior Bernardo  Additional names:

## 2025-08-06 ENCOUNTER — HOSPITAL ENCOUNTER (OUTPATIENT)
Dept: CV DIAGNOSTICS | Age: 70
Discharge: HOME | End: 2025-08-06
Attending: FAMILY MEDICINE

## 2025-08-06 DIAGNOSIS — R06.09 EXERTIONAL DYSPNEA: ICD-10-CM

## 2025-08-06 PROCEDURE — 93306 TTE W/DOPPLER COMPLETE: CPT | Performed by: FAMILY MEDICINE

## 2025-08-07 ENCOUNTER — RESULTS FOLLOW-UP (OUTPATIENT)
Dept: FAMILY MEDICINE CLINIC | Facility: CLINIC | Age: 70
End: 2025-08-07

## 2025-08-18 DIAGNOSIS — M79.89 SWOLLEN FEET: ICD-10-CM

## 2025-08-18 RX ORDER — FUROSEMIDE 20 MG/1
20 TABLET ORAL DAILY
Qty: 30 TABLET | Refills: 0 | Status: SHIPPED | OUTPATIENT
Start: 2025-08-18 | End: 2025-09-17

## (undated) DIAGNOSIS — E78.2 MIXED HYPERLIPIDEMIA: Primary | ICD-10-CM

## (undated) DEVICE — TRAP SPEC REMOVAL ETRAP 15CM

## (undated) DEVICE — ENDOSCOPY PACK - LOWER: Brand: MEDLINE INDUSTRIES, INC.

## (undated) DEVICE — SNARE 9MM 230CM 2.4MM EXACTO

## (undated) DEVICE — FILTERLINE NASAL ADULT O2/CO2

## (undated) DEVICE — Device: Brand: DEFENDO AIR/WATER/SUCTION AND BIOPSY VALVE

## (undated) DEVICE — 1200CC GUARDIAN II: Brand: GUARDIAN

## (undated) DEVICE — 3M™ RED DOT™ MONITORING ELECTRODE WITH FOAM TAPE AND STICKY GEL, 50/BAG, 20/CASE, 72/PLT 2570: Brand: RED DOT™